# Patient Record
Sex: FEMALE | Race: WHITE | NOT HISPANIC OR LATINO | URBAN - METROPOLITAN AREA
[De-identification: names, ages, dates, MRNs, and addresses within clinical notes are randomized per-mention and may not be internally consistent; named-entity substitution may affect disease eponyms.]

---

## 2019-02-13 PROBLEM — Z00.00 ENCOUNTER FOR PREVENTIVE HEALTH EXAMINATION: Status: ACTIVE | Noted: 2019-02-13

## 2019-02-14 ENCOUNTER — EMERGENCY (EMERGENCY)
Facility: HOSPITAL | Age: 64
LOS: 0 days | Discharge: HOME | End: 2019-02-15
Attending: EMERGENCY MEDICINE | Admitting: EMERGENCY MEDICINE

## 2019-02-14 ENCOUNTER — APPOINTMENT (OUTPATIENT)
Dept: OTOLARYNGOLOGY | Facility: CLINIC | Age: 64
End: 2019-02-14
Payer: COMMERCIAL

## 2019-02-14 VITALS
HEART RATE: 62 BPM | RESPIRATION RATE: 20 BRPM | SYSTOLIC BLOOD PRESSURE: 158 MMHG | OXYGEN SATURATION: 100 % | DIASTOLIC BLOOD PRESSURE: 76 MMHG

## 2019-02-14 VITALS
SYSTOLIC BLOOD PRESSURE: 118 MMHG | WEIGHT: 152 LBS | HEIGHT: 67 IN | DIASTOLIC BLOOD PRESSURE: 78 MMHG | BODY MASS INDEX: 23.86 KG/M2

## 2019-02-14 DIAGNOSIS — Z80.1 FAMILY HISTORY OF MALIGNANT NEOPLASM OF TRACHEA, BRONCHUS AND LUNG: ICD-10-CM

## 2019-02-14 DIAGNOSIS — R42 DIZZINESS AND GIDDINESS: ICD-10-CM

## 2019-02-14 DIAGNOSIS — Z79.899 OTHER LONG TERM (CURRENT) DRUG THERAPY: ICD-10-CM

## 2019-02-14 DIAGNOSIS — Z80.8 FAMILY HISTORY OF MALIGNANT NEOPLASM OF OTHER ORGANS OR SYSTEMS: ICD-10-CM

## 2019-02-14 DIAGNOSIS — Z78.9 OTHER SPECIFIED HEALTH STATUS: ICD-10-CM

## 2019-02-14 DIAGNOSIS — Z80.3 FAMILY HISTORY OF MALIGNANT NEOPLASM OF BREAST: ICD-10-CM

## 2019-02-14 DIAGNOSIS — R09.81 NASAL CONGESTION: ICD-10-CM

## 2019-02-14 DIAGNOSIS — H81.12 BENIGN PAROXYSMAL VERTIGO, LEFT EAR: ICD-10-CM

## 2019-02-14 DIAGNOSIS — M54.2 CERVICALGIA: ICD-10-CM

## 2019-02-14 DIAGNOSIS — R11.0 NAUSEA: ICD-10-CM

## 2019-02-14 DIAGNOSIS — E03.9 HYPOTHYROIDISM, UNSPECIFIED: ICD-10-CM

## 2019-02-14 DIAGNOSIS — Z86.39 PERSONAL HISTORY OF OTHER ENDOCRINE, NUTRITIONAL AND METABOLIC DISEASE: ICD-10-CM

## 2019-02-14 DIAGNOSIS — H91.93 UNSPECIFIED HEARING LOSS, BILATERAL: ICD-10-CM

## 2019-02-14 DIAGNOSIS — T78.40XA ALLERGY, UNSPECIFIED, INITIAL ENCOUNTER: ICD-10-CM

## 2019-02-14 LAB
ALBUMIN SERPL ELPH-MCNC: 4.8 G/DL — SIGNIFICANT CHANGE UP (ref 3.5–5.2)
ALP SERPL-CCNC: 83 U/L — SIGNIFICANT CHANGE UP (ref 30–115)
ALT FLD-CCNC: 11 U/L — SIGNIFICANT CHANGE UP (ref 0–41)
ANION GAP SERPL CALC-SCNC: 19 MMOL/L — HIGH (ref 7–14)
AST SERPL-CCNC: 18 U/L — SIGNIFICANT CHANGE UP (ref 0–41)
BASOPHILS # BLD AUTO: 0.04 K/UL — SIGNIFICANT CHANGE UP (ref 0–0.2)
BASOPHILS NFR BLD AUTO: 0.5 % — SIGNIFICANT CHANGE UP (ref 0–1)
BILIRUB SERPL-MCNC: 0.4 MG/DL — SIGNIFICANT CHANGE UP (ref 0.2–1.2)
BUN SERPL-MCNC: 17 MG/DL — SIGNIFICANT CHANGE UP (ref 10–20)
CALCIUM SERPL-MCNC: 9.8 MG/DL — SIGNIFICANT CHANGE UP (ref 8.5–10.1)
CHLORIDE SERPL-SCNC: 97 MMOL/L — LOW (ref 98–110)
CO2 SERPL-SCNC: 24 MMOL/L — SIGNIFICANT CHANGE UP (ref 17–32)
CREAT SERPL-MCNC: 0.7 MG/DL — SIGNIFICANT CHANGE UP (ref 0.7–1.5)
EOSINOPHIL # BLD AUTO: 0.03 K/UL — SIGNIFICANT CHANGE UP (ref 0–0.7)
EOSINOPHIL NFR BLD AUTO: 0.4 % — SIGNIFICANT CHANGE UP (ref 0–8)
GLUCOSE SERPL-MCNC: 107 MG/DL — HIGH (ref 70–99)
HCT VFR BLD CALC: 42.1 % — SIGNIFICANT CHANGE UP (ref 37–47)
HGB BLD-MCNC: 14.1 G/DL — SIGNIFICANT CHANGE UP (ref 12–16)
IMM GRANULOCYTES NFR BLD AUTO: 0.4 % — HIGH (ref 0.1–0.3)
LYMPHOCYTES # BLD AUTO: 1.25 K/UL — SIGNIFICANT CHANGE UP (ref 1.2–3.4)
LYMPHOCYTES # BLD AUTO: 14.9 % — LOW (ref 20.5–51.1)
MAGNESIUM SERPL-MCNC: 2.2 MG/DL — SIGNIFICANT CHANGE UP (ref 1.8–2.4)
MCHC RBC-ENTMCNC: 28.5 PG — SIGNIFICANT CHANGE UP (ref 27–31)
MCHC RBC-ENTMCNC: 33.5 G/DL — SIGNIFICANT CHANGE UP (ref 32–37)
MCV RBC AUTO: 85.1 FL — SIGNIFICANT CHANGE UP (ref 81–99)
MONOCYTES # BLD AUTO: 0.31 K/UL — SIGNIFICANT CHANGE UP (ref 0.1–0.6)
MONOCYTES NFR BLD AUTO: 3.7 % — SIGNIFICANT CHANGE UP (ref 1.7–9.3)
NEUTROPHILS # BLD AUTO: 6.73 K/UL — HIGH (ref 1.4–6.5)
NEUTROPHILS NFR BLD AUTO: 80.1 % — HIGH (ref 42.2–75.2)
NRBC # BLD: 0 /100 WBCS — SIGNIFICANT CHANGE UP (ref 0–0)
PLATELET # BLD AUTO: 192 K/UL — SIGNIFICANT CHANGE UP (ref 130–400)
POTASSIUM SERPL-MCNC: 4.1 MMOL/L — SIGNIFICANT CHANGE UP (ref 3.5–5)
POTASSIUM SERPL-SCNC: 4.1 MMOL/L — SIGNIFICANT CHANGE UP (ref 3.5–5)
PROT SERPL-MCNC: 7.3 G/DL — SIGNIFICANT CHANGE UP (ref 6–8)
RBC # BLD: 4.95 M/UL — SIGNIFICANT CHANGE UP (ref 4.2–5.4)
RBC # FLD: 12.1 % — SIGNIFICANT CHANGE UP (ref 11.5–14.5)
SODIUM SERPL-SCNC: 140 MMOL/L — SIGNIFICANT CHANGE UP (ref 135–146)
TROPONIN T SERPL-MCNC: <0.01 NG/ML — SIGNIFICANT CHANGE UP
WBC # BLD: 8.39 K/UL — SIGNIFICANT CHANGE UP (ref 4.8–10.8)
WBC # FLD AUTO: 8.39 K/UL — SIGNIFICANT CHANGE UP (ref 4.8–10.8)

## 2019-02-14 PROCEDURE — 95992 CANALITH REPOSITIONING PROC: CPT

## 2019-02-14 PROCEDURE — 99204 OFFICE O/P NEW MOD 45 MIN: CPT | Mod: 25

## 2019-02-14 PROCEDURE — 92570 ACOUSTIC IMMITANCE TESTING: CPT

## 2019-02-14 PROCEDURE — 92557 COMPREHENSIVE HEARING TEST: CPT

## 2019-02-14 RX ORDER — FLUCONAZOLE 150 MG/1
150 TABLET ORAL
Qty: 3 | Refills: 0 | Status: ACTIVE | COMMUNITY
Start: 2018-08-24

## 2019-02-14 RX ORDER — CIPROFLOXACIN HYDROCHLORIDE 500 MG/1
500 TABLET, FILM COATED ORAL
Qty: 20 | Refills: 0 | Status: ACTIVE | COMMUNITY
Start: 2019-02-06

## 2019-02-14 RX ORDER — FLUTICASONE PROPIONATE 50 UG/1
50 SPRAY, METERED NASAL
Qty: 16 | Refills: 0 | Status: ACTIVE | COMMUNITY
Start: 2019-02-03

## 2019-02-14 RX ORDER — TERCONAZOLE 4 MG/G
0.4 CREAM VAGINAL
Qty: 45 | Refills: 0 | Status: COMPLETED | COMMUNITY
Start: 2018-08-24

## 2019-02-14 RX ORDER — MONTELUKAST 10 MG/1
10 TABLET, FILM COATED ORAL
Qty: 30 | Refills: 0 | Status: ACTIVE | COMMUNITY
Start: 2018-08-22

## 2019-02-14 RX ORDER — LEVOTHYROXINE SODIUM 0.12 MG/1
125 TABLET ORAL
Qty: 64 | Refills: 0 | Status: COMPLETED | COMMUNITY
Start: 2018-11-06

## 2019-02-14 RX ORDER — MECLIZINE HCL 12.5 MG
50 TABLET ORAL ONCE
Qty: 0 | Refills: 0 | Status: COMPLETED | OUTPATIENT
Start: 2019-02-14 | End: 2019-02-14

## 2019-02-14 RX ORDER — SODIUM CHLORIDE 9 MG/ML
1000 INJECTION INTRAMUSCULAR; INTRAVENOUS; SUBCUTANEOUS ONCE
Qty: 0 | Refills: 0 | Status: COMPLETED | OUTPATIENT
Start: 2019-02-14 | End: 2019-02-14

## 2019-02-14 RX ADMIN — Medication 50 MILLIGRAM(S): at 20:50

## 2019-02-14 RX ADMIN — SODIUM CHLORIDE 1000 MILLILITER(S): 9 INJECTION INTRAMUSCULAR; INTRAVENOUS; SUBCUTANEOUS at 20:50

## 2019-02-14 NOTE — ED PROVIDER NOTE - PROGRESS NOTE DETAILS
Pt s/o to Dr. Gutiérrez to follow up imaging, reassess and dispo. Pt feels better at this time. Labs and imaging discussed with pt and results printed for pt. Will give meclizine and have pt f/up with ENT.

## 2019-02-14 NOTE — ASSESSMENT
[FreeTextEntry1] : - Eply performed today, handout provided and reviewed\par - f/up in 2-3 months. Will repeat audiogram at that time

## 2019-02-14 NOTE — ED PROVIDER NOTE - CARE PROVIDER_API CALL
Vivian Pedro)  Otolaryngology  [address] [place] [address], [address] [place] [address]  Phone: [phone]  Fax: [phone]  Follow Up Time: 1-3 Days

## 2019-02-14 NOTE — DATA REVIEWED
[de-identified] : 2/14/19: right normal hearing 250-4khz, sloping to mod SNHL at 8khz. Left: normal hearing 250-3khz sloping to mod/mod severe mixed HL at 8khz. Type A tymps bilaterally.

## 2019-02-14 NOTE — ED PROVIDER NOTE - ATTENDING CONTRIBUTION TO CARE
64 yo female with PMH hypothyroidism presents c/o dizziness x 2 weeks. Pt [place] [facility] [place] Claritin as she feels it is related to her sinuses. Reports pressure in face and intermittent left sided HA. Denies any focal weakness, paresthesias, changes in speech, vision, or ataxia. Pt reports sx worse with movement. + nasal congestion and ear fullness. HAs treated with ibuprofen with some relief. Seen by ENT today who performed Epley maneuver which worsened sx significantly  per pt.     VITAL SIGNS: noted  CONSTITUTIONAL: Well-developed; well-nourished; in no acute distress  HEAD: Normocephalic; atraumatic  EYES: PERRL, EOM intact; conjunctiva and sclera clear  ENT: No nasal discharge; airway clear. MMM  NECK: Supple; non tender. No anterior cervical lymphadenopathy noted  CARD: S1, S2 normal; no murmurs, gallops, or rubs. Regular rate and rhythm  RESP: CTAB/L, no wheezes, rales or rhonchi  ABD: Normal bowel sounds; soft; non-distended; non-tender; no hepatosplenomegaly. No CVA tenderness  EXT: Normal ROM. No calf tenderness or edema. Distal pulses intact  NEURO: AAO x 3, normal speech, no facial asymmetry, negative pronator drift, no ataxia, positive Romberg, no dysdiadokinesia, no nystagmus,  sensory equal and intact.   SKIN: Skin exam is warm and dry, no acute rash  MS: No midline spinal tenderness , + left paracervical tenderness, no skin changes

## 2019-02-14 NOTE — ED PROVIDER NOTE - OBJECTIVE STATEMENT
Pt is a 64 y/o female with hx of hypothyroidism who presents to ED for dizziness for 1 week, intermittent, worse with head movement, described as room spinning. + fullness to ears that resolved, and sinus pressure, congestion. + nausea. No vomiting, headache, chest pain, SOB, palpitations, syncope. Pt was [place] [facility] [place] started on flonase and claritin with mild relief. Today pt was seen by Dr. Pedro, had Epley's maneuver performed and felt worse after so came here for eval. No hx of similar.

## 2019-02-14 NOTE — CONSULT LETTER
[Dear  ___] : Dear  [unfilled], [Consult Letter:] : I had the pleasure of evaluating your patient, [unfilled]. [Please see my note below.] : Please see my note below. [Consult Closing:] : Thank you very much for allowing me to participate in the care of this patient.  If you have any questions, please do not hesitate to contact me. [Sincerely,] : Sincerely, [FreeTextEntry2] : Grazyna Stokes MD [FreeTextEntry3] : Vivian Pedro MD\par Otolaryngology - Head & Neck Surgery\par

## 2019-02-14 NOTE — ED PROVIDER NOTE - CLINICAL SUMMARY MEDICAL DECISION MAKING FREE TEXT BOX
pt with neck pain/dizziness after epley manvr. sx improved after antivert. had neg CT/CTA.  no focal def. dc w/ outpt f/u.

## 2019-02-14 NOTE — ED PROVIDER NOTE - PHYSICAL EXAMINATION
Constitutional: Well developed, well nourished. NAD.  Head: Normocephalic, atraumatic.  Eyes: PERRL. EOMI.  ENT: No nasal discharge. Mucous membranes moist.  Neck: Supple. Painless ROM.  Cardiovascular: Normal S1, S2. Regular rate and rhythm. No murmurs, rubs, or gallops.  Pulmonary: Normal respiratory rate and effort. Lungs clear to auscultation bilaterally. No wheezing, rales, or rhonchi.  Abdominal: Soft. Nondistended. Nontender. No rebound, guarding, rigidity.  Extremities. Pelvis stable. No lower extremity edema, symmetric calves.  Skin: No rashes, cyanosis.  Neuro: CN II-XII grossly intact, no facial asymmetry, no slurred speech. Strength 5/5 in upper and lower extremities. Sensation intact in all extremities. FNF testing normal. No pronator drift. Normal HINTS exam.  Psych: Normal mood. Normal affect.

## 2019-02-14 NOTE — ED PROVIDER NOTE - NS ED ROS FT
Constitutional: No fever, chills.  Eyes: No visual changes.  ENT: No hearing changes. No sore throat.  Neck: No neck pain or stiffness.  Cardiovascular: No chest pain, palpitations, edema.  Pulmonary: No SOB, cough. No hemoptysis.  Abdominal: No abdominal pain, vomiting, diarrhea. + nausea.  : No dysuria, frequency.  Neuro: No headache, syncope. + dizziness.  MS: No back pain. No calf pain/swelling.  Psych: No suicidal ideations.

## 2019-02-14 NOTE — HISTORY OF PRESENT ILLNESS
[de-identified] : Patient here today c/o recent onset of dizziness. Patient states 2/2/19 she woke up feeling dizzy, room spinning sensation, worse with turning to left. Patient admits when she sits up fast she has room spinning. Sensation lasts a few minutes. She is unsure of hearing loss or tinnitus. Dizziness happens when laying to sitting. Dizziness is every morning. Also gets it during day if bends over. She then developed left ear pain. Patient was seen in urgent care was told to have fluid in ears. Was given Flonase and Claritin which has not helped that much. Patient feels facial and ear pressure. Worse on the left. Patient unable to sleep on left side.  Patient also c/o PND. Patient has a family h/o Menieres disease. She is also planning on flying in 2 weeks. PCP did not see fluid 2 days ago. She also reports itchiness in her ears, bilaterally. Having left ear pressure now, started day after dizziness started. She is not having nasal congestion, no rhinorrhea. Ear itching has happened before, worse recently. Denies fevers, chills.

## 2019-02-15 VITALS — WEIGHT: 145.06 LBS

## 2019-02-15 RX ORDER — LORATADINE, PSEUDOEPHEDRINE SULFATE 5; 120 MG/1; MG/1
1 TABLET, FILM COATED, EXTENDED RELEASE ORAL
Qty: 14 | Refills: 0
Start: 2019-02-15 | End: 2019-02-28

## 2019-02-15 RX ORDER — MECLIZINE HCL 12.5 MG
1 TABLET ORAL
Qty: 15 | Refills: 0
Start: 2019-02-15 | End: 2019-02-19

## 2019-02-15 RX ADMIN — SODIUM CHLORIDE 1000 MILLILITER(S): 9 INJECTION INTRAMUSCULAR; INTRAVENOUS; SUBCUTANEOUS at 02:16

## 2020-04-21 ENCOUNTER — APPOINTMENT (OUTPATIENT)
Dept: OBGYN | Facility: CLINIC | Age: 65
End: 2020-04-21

## 2022-03-03 ENCOUNTER — LABORATORY RESULT (OUTPATIENT)
Age: 67
End: 2022-03-03

## 2022-03-04 ENCOUNTER — RESULT CHARGE (OUTPATIENT)
Age: 67
End: 2022-03-04

## 2022-03-04 ENCOUNTER — APPOINTMENT (OUTPATIENT)
Dept: OBGYN | Facility: CLINIC | Age: 67
End: 2022-03-04
Payer: MEDICARE

## 2022-03-04 VITALS
BODY MASS INDEX: 25.74 KG/M2 | SYSTOLIC BLOOD PRESSURE: 134 MMHG | DIASTOLIC BLOOD PRESSURE: 79 MMHG | HEIGHT: 67 IN | HEART RATE: 71 BPM | TEMPERATURE: 97.6 F | OXYGEN SATURATION: 99 % | WEIGHT: 164 LBS

## 2022-03-04 DIAGNOSIS — N95.8 OTHER SPECIFIED MENOPAUSAL AND PERIMENOPAUSAL DISORDERS: ICD-10-CM

## 2022-03-04 LAB
BILIRUB UR QL STRIP: NORMAL
CLARITY UR: CLEAR
COLLECTION METHOD: NORMAL
GLUCOSE UR-MCNC: NORMAL
HCG UR QL: 0.2 EU/DL
HGB UR QL STRIP.AUTO: NORMAL
KETONES UR-MCNC: NORMAL
LEUKOCYTE ESTERASE UR QL STRIP: NORMAL
NITRITE UR QL STRIP: NORMAL
PH UR STRIP: 7
PROT UR STRIP-MCNC: NORMAL
SP GR UR STRIP: 1.02

## 2022-03-04 PROCEDURE — 99213 OFFICE O/P EST LOW 20 MIN: CPT

## 2022-03-04 NOTE — HISTORY OF PRESENT ILLNESS
[FreeTextEntry1] : pt in office for annual exam.  Patient Yaneth Bui presents for annual GYN visit she denies any postmenopausal bleeding she had a mammography in October which was normal and a colonoscopy in 2021.

## 2022-05-06 ENCOUNTER — NON-APPOINTMENT (OUTPATIENT)
Age: 67
End: 2022-05-06

## 2022-08-07 ENCOUNTER — NON-APPOINTMENT (OUTPATIENT)
Age: 67
End: 2022-08-07

## 2023-02-15 ENCOUNTER — EMERGENCY (EMERGENCY)
Facility: HOSPITAL | Age: 68
LOS: 0 days | Discharge: HOME | End: 2023-02-15
Attending: EMERGENCY MEDICINE
Payer: MEDICARE

## 2023-02-15 VITALS
OXYGEN SATURATION: 100 % | RESPIRATION RATE: 16 BRPM | DIASTOLIC BLOOD PRESSURE: 79 MMHG | HEART RATE: 54 BPM | TEMPERATURE: 95 F | SYSTOLIC BLOOD PRESSURE: 138 MMHG

## 2023-02-15 VITALS
DIASTOLIC BLOOD PRESSURE: 90 MMHG | HEART RATE: 70 BPM | SYSTOLIC BLOOD PRESSURE: 145 MMHG | RESPIRATION RATE: 18 BRPM | OXYGEN SATURATION: 98 % | TEMPERATURE: 98 F

## 2023-02-15 DIAGNOSIS — J18.9 PNEUMONIA, UNSPECIFIED ORGANISM: ICD-10-CM

## 2023-02-15 DIAGNOSIS — R42 DIZZINESS AND GIDDINESS: ICD-10-CM

## 2023-02-15 DIAGNOSIS — R06.02 SHORTNESS OF BREATH: ICD-10-CM

## 2023-02-15 DIAGNOSIS — R11.2 NAUSEA WITH VOMITING, UNSPECIFIED: ICD-10-CM

## 2023-02-15 DIAGNOSIS — E03.9 HYPOTHYROIDISM, UNSPECIFIED: ICD-10-CM

## 2023-02-15 DIAGNOSIS — Z20.822 CONTACT WITH AND (SUSPECTED) EXPOSURE TO COVID-19: ICD-10-CM

## 2023-02-15 LAB
ALBUMIN SERPL ELPH-MCNC: 4.5 G/DL — SIGNIFICANT CHANGE UP (ref 3.5–5.2)
ALP SERPL-CCNC: 88 U/L — SIGNIFICANT CHANGE UP (ref 30–115)
ALT FLD-CCNC: 10 U/L — SIGNIFICANT CHANGE UP (ref 0–41)
ANION GAP SERPL CALC-SCNC: 7 MMOL/L — SIGNIFICANT CHANGE UP (ref 7–14)
APPEARANCE UR: CLEAR — SIGNIFICANT CHANGE UP
AST SERPL-CCNC: 18 U/L — SIGNIFICANT CHANGE UP (ref 0–41)
BASOPHILS # BLD AUTO: 0.06 K/UL — SIGNIFICANT CHANGE UP (ref 0–0.2)
BASOPHILS NFR BLD AUTO: 0.6 % — SIGNIFICANT CHANGE UP (ref 0–1)
BILIRUB SERPL-MCNC: 0.2 MG/DL — SIGNIFICANT CHANGE UP (ref 0.2–1.2)
BILIRUB UR-MCNC: NEGATIVE — SIGNIFICANT CHANGE UP
BUN SERPL-MCNC: 16 MG/DL — SIGNIFICANT CHANGE UP (ref 10–20)
CALCIUM SERPL-MCNC: 9.3 MG/DL — SIGNIFICANT CHANGE UP (ref 8.4–10.4)
CHLORIDE SERPL-SCNC: 103 MMOL/L — SIGNIFICANT CHANGE UP (ref 98–110)
CO2 SERPL-SCNC: 28 MMOL/L — SIGNIFICANT CHANGE UP (ref 17–32)
COLOR SPEC: SIGNIFICANT CHANGE UP
CREAT SERPL-MCNC: 0.7 MG/DL — SIGNIFICANT CHANGE UP (ref 0.7–1.5)
DIFF PNL FLD: NEGATIVE — SIGNIFICANT CHANGE UP
EGFR: 95 ML/MIN/1.73M2 — SIGNIFICANT CHANGE UP
EOSINOPHIL # BLD AUTO: 0.03 K/UL — SIGNIFICANT CHANGE UP (ref 0–0.7)
EOSINOPHIL NFR BLD AUTO: 0.3 % — SIGNIFICANT CHANGE UP (ref 0–8)
FLUAV AG NPH QL: SIGNIFICANT CHANGE UP
FLUBV AG NPH QL: SIGNIFICANT CHANGE UP
GLUCOSE SERPL-MCNC: 122 MG/DL — HIGH (ref 70–99)
GLUCOSE UR QL: NEGATIVE — SIGNIFICANT CHANGE UP
HCT VFR BLD CALC: 41.4 % — SIGNIFICANT CHANGE UP (ref 37–47)
HGB BLD-MCNC: 13.6 G/DL — SIGNIFICANT CHANGE UP (ref 12–16)
IMM GRANULOCYTES NFR BLD AUTO: 0.3 % — SIGNIFICANT CHANGE UP (ref 0.1–0.3)
KETONES UR-MCNC: ABNORMAL
LACTATE SERPL-SCNC: 1.6 MMOL/L — SIGNIFICANT CHANGE UP (ref 0.7–2)
LEUKOCYTE ESTERASE UR-ACNC: NEGATIVE — SIGNIFICANT CHANGE UP
LIDOCAIN IGE QN: 31 U/L — SIGNIFICANT CHANGE UP (ref 7–60)
LYMPHOCYTES # BLD AUTO: 1.11 K/UL — LOW (ref 1.2–3.4)
LYMPHOCYTES # BLD AUTO: 11.8 % — LOW (ref 20.5–51.1)
MCHC RBC-ENTMCNC: 29.3 PG — SIGNIFICANT CHANGE UP (ref 27–31)
MCHC RBC-ENTMCNC: 32.9 G/DL — SIGNIFICANT CHANGE UP (ref 32–37)
MCV RBC AUTO: 89.2 FL — SIGNIFICANT CHANGE UP (ref 81–99)
MONOCYTES # BLD AUTO: 0.36 K/UL — SIGNIFICANT CHANGE UP (ref 0.1–0.6)
MONOCYTES NFR BLD AUTO: 3.8 % — SIGNIFICANT CHANGE UP (ref 1.7–9.3)
NEUTROPHILS # BLD AUTO: 7.78 K/UL — HIGH (ref 1.4–6.5)
NEUTROPHILS NFR BLD AUTO: 83.2 % — HIGH (ref 42.2–75.2)
NITRITE UR-MCNC: NEGATIVE — SIGNIFICANT CHANGE UP
NRBC # BLD: 0 /100 WBCS — SIGNIFICANT CHANGE UP (ref 0–0)
PH UR: 7 — SIGNIFICANT CHANGE UP (ref 5–8)
PLATELET # BLD AUTO: 187 K/UL — SIGNIFICANT CHANGE UP (ref 130–400)
POTASSIUM SERPL-MCNC: 4.3 MMOL/L — SIGNIFICANT CHANGE UP (ref 3.5–5)
POTASSIUM SERPL-SCNC: 4.3 MMOL/L — SIGNIFICANT CHANGE UP (ref 3.5–5)
PROT SERPL-MCNC: 6.5 G/DL — SIGNIFICANT CHANGE UP (ref 6–8)
PROT UR-MCNC: SIGNIFICANT CHANGE UP
RBC # BLD: 4.64 M/UL — SIGNIFICANT CHANGE UP (ref 4.2–5.4)
RBC # FLD: 12.4 % — SIGNIFICANT CHANGE UP (ref 11.5–14.5)
RSV RNA NPH QL NAA+NON-PROBE: SIGNIFICANT CHANGE UP
SARS-COV-2 RNA SPEC QL NAA+PROBE: SIGNIFICANT CHANGE UP
SODIUM SERPL-SCNC: 138 MMOL/L — SIGNIFICANT CHANGE UP (ref 135–146)
SP GR SPEC: 1.01 — SIGNIFICANT CHANGE UP (ref 1.01–1.03)
TROPONIN T SERPL-MCNC: <0.01 NG/ML — SIGNIFICANT CHANGE UP
UROBILINOGEN FLD QL: SIGNIFICANT CHANGE UP
WBC # BLD: 9.37 K/UL — SIGNIFICANT CHANGE UP (ref 4.8–10.8)
WBC # FLD AUTO: 9.37 K/UL — SIGNIFICANT CHANGE UP (ref 4.8–10.8)

## 2023-02-15 PROCEDURE — 96374 THER/PROPH/DIAG INJ IV PUSH: CPT | Mod: XU

## 2023-02-15 PROCEDURE — 99285 EMERGENCY DEPT VISIT HI MDM: CPT | Mod: FS

## 2023-02-15 PROCEDURE — 71045 X-RAY EXAM CHEST 1 VIEW: CPT

## 2023-02-15 PROCEDURE — 71045 X-RAY EXAM CHEST 1 VIEW: CPT | Mod: 26

## 2023-02-15 PROCEDURE — 71260 CT THORAX DX C+: CPT | Mod: MA

## 2023-02-15 PROCEDURE — 80053 COMPREHEN METABOLIC PANEL: CPT

## 2023-02-15 PROCEDURE — 85025 COMPLETE CBC W/AUTO DIFF WBC: CPT

## 2023-02-15 PROCEDURE — 84484 ASSAY OF TROPONIN QUANT: CPT

## 2023-02-15 PROCEDURE — 93005 ELECTROCARDIOGRAM TRACING: CPT

## 2023-02-15 PROCEDURE — 87086 URINE CULTURE/COLONY COUNT: CPT

## 2023-02-15 PROCEDURE — 81003 URINALYSIS AUTO W/O SCOPE: CPT

## 2023-02-15 PROCEDURE — 0241U: CPT

## 2023-02-15 PROCEDURE — 93010 ELECTROCARDIOGRAM REPORT: CPT

## 2023-02-15 PROCEDURE — 71260 CT THORAX DX C+: CPT | Mod: 26,MA

## 2023-02-15 PROCEDURE — 83605 ASSAY OF LACTIC ACID: CPT

## 2023-02-15 PROCEDURE — 99285 EMERGENCY DEPT VISIT HI MDM: CPT | Mod: 25

## 2023-02-15 PROCEDURE — 83690 ASSAY OF LIPASE: CPT

## 2023-02-15 PROCEDURE — 36415 COLL VENOUS BLD VENIPUNCTURE: CPT

## 2023-02-15 RX ORDER — MECLIZINE HCL 12.5 MG
50 TABLET ORAL ONCE
Refills: 0 | Status: COMPLETED | OUTPATIENT
Start: 2023-02-15 | End: 2023-02-15

## 2023-02-15 RX ORDER — SODIUM CHLORIDE 9 MG/ML
1000 INJECTION INTRAMUSCULAR; INTRAVENOUS; SUBCUTANEOUS ONCE
Refills: 0 | Status: COMPLETED | OUTPATIENT
Start: 2023-02-15 | End: 2023-02-15

## 2023-02-15 RX ORDER — METOCLOPRAMIDE HCL 10 MG
10 TABLET ORAL ONCE
Refills: 0 | Status: COMPLETED | OUTPATIENT
Start: 2023-02-15 | End: 2023-02-15

## 2023-02-15 RX ADMIN — SODIUM CHLORIDE 1000 MILLILITER(S): 9 INJECTION INTRAMUSCULAR; INTRAVENOUS; SUBCUTANEOUS at 18:03

## 2023-02-15 RX ADMIN — Medication 50 MILLIGRAM(S): at 18:03

## 2023-02-15 RX ADMIN — Medication 104 MILLIGRAM(S): at 18:03

## 2023-02-15 NOTE — ED PROVIDER NOTE - NS ED ATTENDING STATEMENT MOD
This was a shared visit with the HERIBERTO. I reviewed and verified the documentation and independently performed the documented:

## 2023-02-15 NOTE — ED PROVIDER NOTE - PATIENT PORTAL LINK FT
You can access the FollowMyHealth Patient Portal offered by [facility] by registering at the following website: [email or web address]. By joining [employer]’s FollowMyHealth portal, you will also be able to view your health information using other applications (apps) compatible with our system.

## 2023-02-15 NOTE — ED PROVIDER NOTE - DIFFERENTIAL DIAGNOSIS
Differential Diagnosis Dizziness, N/V, weakness, chills, mild dyspnea. R/o intracranial pathology vs sepsis vs ACS vs PE vs fluid overload vs pneumonia

## 2023-02-15 NOTE — ED PROVIDER NOTE - CONSIDERATION OF ADMISSION OBSERVATION
Consideration of Admission/Observation Patient with work up in ED showing (+) PNA but well appearing, ambulatory without desaturation, tolerating PO, no other abnormalities in labs. No indication for admission at this time.

## 2023-02-15 NOTE — ED ADULT NURSE NOTE - CAS TRG GEN SKIN CONDITION
Was the patient seen in the last year in this department? Yes    Does patient have an active prescription for medications requested? No     Received Request Via: Pharmacy  
Warm

## 2023-02-15 NOTE — ED ADULT TRIAGE NOTE - CHIEF COMPLAINT QUOTE
BIBA from the dermatology office for an episode of vertigo and weakness BIBA from the dermatology office for an episode of vertigo and weakness pt also reports nausea, states she vomited en route to ED

## 2023-02-15 NOTE — ED PROVIDER NOTE - OBJECTIVE STATEMENT
67 year old female with a history of Vertigo and Hypothyroidism presents to the ED with dizziness. Patient states that she was at her Dermatology Office today for an exam and when she laid down on the exam table she developed sudden onset of dizziness which felt like her normal vertigo. She describes room spinning dizziness worse with change in position associated with nausea, multiple episodes of vomiting, shaking chills, and generalized weakness. Patient denies fevers however she admits to mild shortness of breath over the past week. denies chest pain, cough, sore throat, dysuria, diarrhea, leg swelling, paresthesias, unilateral weakness, headache.

## 2023-02-15 NOTE — ED PROVIDER NOTE - CLINICAL SUMMARY MEDICAL DECISION MAKING FREE TEXT BOX
Patient presented with dizziness described as vertigo (chronic for patient), nausea, vomiting and chills. Otherwise afebrile, HD stable, neurovascularly intact, abd non-tender, lungs clear, normal O2 saturation on RA, NAD. EKG obtained and non-ischemic without evidence of STEMI. Obtained labs which were grossly unremarkable including no significant leukocytosis, anemia, signs of dehydration/SIN, transaminitis or significant electrolyte abnormalities. Trop negative. Chest xray showed (+) ?questional opacity in RLL but unclear, otherwise negative for pneumothorax, widened mediastinum, evidence of rib fractures, enlarged cardiac silhouette or any other emergent pathologies. CT chest obtained which was subsequently negative. Patient felt better after tx in ED. Ambulatory without desaturation, tolerating PO. Given the above, will discharge home with outpatient follow up. Patient agreeable with plan. Agrees to return to ED for any new or worsening symptoms.

## 2023-02-15 NOTE — ED PROVIDER NOTE - PROGRESS NOTE DETAILS
Patient states that her dizziness and nausea are dramatically improved now walking around without difficulty. Ordered CT chest due to concern for right lower lobe opacity on CXR.

## 2023-02-15 NOTE — ED PROVIDER NOTE - TEST CONSIDERED BUT NOT PERFORMED
Dimer considered but not performed - patient without PE risk factors, not tachycardic or hypoxic. Symptoms resolved after work up in ED, no concern for PE at this time. Tests Considered But Not Performed

## 2023-02-15 NOTE — ED PROVIDER NOTE - PHYSICAL EXAMINATION
Physical Exam    Constitutional: noted with shaking chills s/p episode of vomiting.   Eyes: Conjunctiva pink, Sclera clear, PERRLA, EOMI.  ENT: No sinus tenderness. No nasal discharge. No oropharyngeal erythema, edema, or exudates. Uvula midline.   Cardiovascular: Regular rate, regular rhythm. No noted murmurs rubs or gallops.  Respiratory: unlabored respiratory effort, clear to auscultation bilaterally no wheezing, rales or rhonchi  Gastrointestinal: Normal bowel sounds. soft, non distended, non-tender abdomen.   Musculoskeletal: supple neck, no midline tenderness. No joint or bony deformity.   Integumentary: warm, dry, no rash  Neurologic: awake, alert, oriented x 3. CN II-XII intact moving all extremities. No nystagmus.   Psychiatric: appropriate mood, appropriate affect Physical Exam    Constitutional: noted with shaking chills s/p episode of vomiting.   Eyes: Conjunctiva pink, Sclera clear, PERRLA, EOMI.  ENT: No sinus tenderness. No nasal discharge. No oropharyngeal erythema, edema, or exudates. Uvula midline.   Cardiovascular: Regular rate, regular rhythm. No noted murmurs rubs or gallops.  Respiratory: unlabored respiratory effort, clear to auscultation bilaterally no wheezing, rales or rhonchi  Gastrointestinal: Normal bowel sounds. soft, non distended, non-tender abdomen.   Musculoskeletal: supple neck, no midline tenderness. No joint or bony deformity.   Integumentary: warm, dry, no rash  Neurologic: awake, alert, oriented x 3. CN II-XII intact moving all extremities. No nystagmus.

## 2023-02-15 NOTE — ED PROVIDER NOTE - NSFOLLOWUPINSTRUCTIONS_ED_ALL_ED_FT
Vertigo    Vertigo is the feeling that you or your surroundings are moving when they are not. Vertigo can be dangerous if it occurs while you are doing something that could endanger you or others, such as driving.    CAUSES  This condition is caused by a disturbance in the signals that are sent by your body's sensory systems to your brain. Different causes of a disturbance can lead to vertigo, including:    Infections, especially in the inner ear.  A bad reaction to a drug, or misuse of alcohol and medicines.  Withdrawal from drugs or alcohol.  Quickly changing positions, as when lying down or rolling over in bed.  Migraine headaches.  Decreased blood flow to the brain.  Decreased blood pressure.  Increased pressure in the brain from a head or neck injury, stroke, infection, tumor, or bleeding.  Central nervous system disorders.    SYMPTOMS  Symptoms of this condition usually occur when you move your head or your eyes in different directions. Symptoms may start suddenly, and they usually last for less than a minute. Symptoms may include:    Loss of balance and falling.  Feeling like you are spinning or moving.  Feeling like your surroundings are spinning or moving.  Nausea and vomiting.  Blurred vision or double vision.  Difficulty hearing.  Slurred speech.  Dizziness.  Involuntary eye movement (nystagmus).    Symptoms can be mild and cause only slight annoyance, or they can be severe and interfere with daily life. Episodes of vertigo may return (recur) over time, and they are often triggered by certain movements. Symptoms may improve over time.    DIAGNOSIS  This condition may be diagnosed based on medical history and the quality of your nystagmus. Your health care provider may test your eye movements by asking you to quickly change positions to trigger the nystagmus. This may be called the [place]-Hallpike test, head thrust test, or roll test. You may be referred to a health care provider who specializes in ear, nose, and throat (ENT) problems (otolaryngologist) or a provider who specializes in disorders of the central nervous system (neurologist).    You may have additional testing, including:    A physical exam.  Blood tests.  MRI.  A CT scan.  An electrocardiogram (ECG). This records electrical activity in your heart.  An electroencephalogram (EEG). This records electrical activity in your brain.  Hearing tests.    TREATMENT  Treatment for this condition depends on the cause and the severity of the symptoms. Treatment options include:    Medicines to treat nausea or vertigo. These are usually used for severe cases. Some medicines that are used to treat other conditions may also reduce or eliminate vertigo symptoms. These include:  Medicines that control allergies (antihistamines).  Medicines that control seizures (anticonvulsants).  Medicines that relieve depression (antidepressants).  Medicines that relieve anxiety (sedatives).  Head movements to adjust your inner ear back to normal. If your vertigo is caused by an ear problem, your health care provider may recommend certain movements to correct the problem.  Surgery. This is rare.    HOME CARE INSTRUCTIONS  Safety     Move slowly. Avoid sudden body or head movements.  Avoid driving.  Avoid operating heavy machinery.  Avoid doing any tasks that would cause danger to you or others if you would have a vertigo episode during the task.  If you have trouble walking or keeping your balance, try using a cane for stability. If you feel dizzy or unstable, sit down right away.  Return to your normal activities as told by your health care provider. Ask your health care provider what activities are safe for you.    General Instructions     Take over-the-counter and prescription medicines only as told by your health care provider.  Avoid certain positions or movements as told by your health care provider.  Drink enough fluid to keep your urine clear or pale yellow.  Keep all follow-up visits as told by your health care provider. This is important.    SEEK MEDICAL CARE IF:  Your medicines do not relieve your vertigo or they make it worse.  You have a fever.  Your condition gets worse or you develop new symptoms.  Your family or friends notice any behavioral changes.  Your nausea or vomiting gets worse.  You have numbness or a "pins and needles" sensation in part of your body.    SEEK IMMEDIATE MEDICAL CARE IF:  You have difficulty moving or speaking.  You are always dizzy.  You faint.  You develop severe headaches.  You have weakness in your hands, arms, or legs.  You have changes in your hearing or vision.  You develop a stiff neck.  You develop sensitivity to light.    ADDITIONAL NOTES AND INSTRUCTIONS    Please follow up with your Primary MD in 24-48 hr.  Seek immediate medical care for any new/worsening signs or symptoms.

## 2023-02-15 NOTE — ED ADULT NURSE NOTE - OBJECTIVE STATEMENT
BIBA from the dermatology office for an episode of vertigo and weakness pt also reports nausea, states she vomited en route to ED

## 2023-02-16 LAB
CULTURE RESULTS: SIGNIFICANT CHANGE UP
SPECIMEN SOURCE: SIGNIFICANT CHANGE UP

## 2023-06-23 ENCOUNTER — EMERGENCY (EMERGENCY)
Facility: HOSPITAL | Age: 68
LOS: 0 days | Discharge: HOME | End: 2023-06-23
Attending: EMERGENCY MEDICINE
Payer: MEDICARE

## 2023-06-23 VITALS
RESPIRATION RATE: 18 BRPM | OXYGEN SATURATION: 99 % | DIASTOLIC BLOOD PRESSURE: 75 MMHG | WEIGHT: 149.91 LBS | TEMPERATURE: 98 F | HEART RATE: 81 BPM | SYSTOLIC BLOOD PRESSURE: 116 MMHG | HEIGHT: 67 IN

## 2023-06-23 DIAGNOSIS — R42 DIZZINESS AND GIDDINESS: ICD-10-CM

## 2023-06-23 DIAGNOSIS — R10.9 UNSPECIFIED ABDOMINAL PAIN: ICD-10-CM

## 2023-06-23 DIAGNOSIS — R11.2 NAUSEA WITH VOMITING, UNSPECIFIED: ICD-10-CM

## 2023-06-23 DIAGNOSIS — E03.9 HYPOTHYROIDISM, UNSPECIFIED: ICD-10-CM

## 2023-06-23 DIAGNOSIS — R10.13 EPIGASTRIC PAIN: ICD-10-CM

## 2023-06-23 LAB
ALBUMIN SERPL ELPH-MCNC: 4.4 G/DL — SIGNIFICANT CHANGE UP (ref 3.5–5.2)
ALP SERPL-CCNC: 88 U/L — SIGNIFICANT CHANGE UP (ref 30–115)
ALT FLD-CCNC: 10 U/L — SIGNIFICANT CHANGE UP (ref 0–41)
ANION GAP SERPL CALC-SCNC: 14 MMOL/L — SIGNIFICANT CHANGE UP (ref 7–14)
AST SERPL-CCNC: 22 U/L — SIGNIFICANT CHANGE UP (ref 0–41)
BASOPHILS # BLD AUTO: 0.04 K/UL — SIGNIFICANT CHANGE UP (ref 0–0.2)
BASOPHILS NFR BLD AUTO: 0.3 % — SIGNIFICANT CHANGE UP (ref 0–1)
BILIRUB SERPL-MCNC: 0.5 MG/DL — SIGNIFICANT CHANGE UP (ref 0.2–1.2)
BUN SERPL-MCNC: 15 MG/DL — SIGNIFICANT CHANGE UP (ref 10–20)
CALCIUM SERPL-MCNC: 9.3 MG/DL — SIGNIFICANT CHANGE UP (ref 8.4–10.5)
CHLORIDE SERPL-SCNC: 103 MMOL/L — SIGNIFICANT CHANGE UP (ref 98–110)
CO2 SERPL-SCNC: 24 MMOL/L — SIGNIFICANT CHANGE UP (ref 17–32)
CREAT SERPL-MCNC: 0.8 MG/DL — SIGNIFICANT CHANGE UP (ref 0.7–1.5)
EGFR: 81 ML/MIN/1.73M2 — SIGNIFICANT CHANGE UP
EOSINOPHIL # BLD AUTO: 0 K/UL — SIGNIFICANT CHANGE UP (ref 0–0.7)
EOSINOPHIL NFR BLD AUTO: 0 % — SIGNIFICANT CHANGE UP (ref 0–8)
GLUCOSE SERPL-MCNC: 124 MG/DL — HIGH (ref 70–99)
HCT VFR BLD CALC: 44.5 % — SIGNIFICANT CHANGE UP (ref 37–47)
HGB BLD-MCNC: 14.9 G/DL — SIGNIFICANT CHANGE UP (ref 12–16)
IMM GRANULOCYTES NFR BLD AUTO: 0.2 % — SIGNIFICANT CHANGE UP (ref 0.1–0.3)
LIDOCAIN IGE QN: 19 U/L — SIGNIFICANT CHANGE UP (ref 7–60)
LYMPHOCYTES # BLD AUTO: 0.72 K/UL — LOW (ref 1.2–3.4)
LYMPHOCYTES # BLD AUTO: 5.3 % — LOW (ref 20.5–51.1)
MCHC RBC-ENTMCNC: 28.6 PG — SIGNIFICANT CHANGE UP (ref 27–31)
MCHC RBC-ENTMCNC: 33.5 G/DL — SIGNIFICANT CHANGE UP (ref 32–37)
MCV RBC AUTO: 85.4 FL — SIGNIFICANT CHANGE UP (ref 81–99)
MONOCYTES # BLD AUTO: 0.29 K/UL — SIGNIFICANT CHANGE UP (ref 0.1–0.6)
MONOCYTES NFR BLD AUTO: 2.1 % — SIGNIFICANT CHANGE UP (ref 1.7–9.3)
NEUTROPHILS # BLD AUTO: 12.42 K/UL — HIGH (ref 1.4–6.5)
NEUTROPHILS NFR BLD AUTO: 92.1 % — HIGH (ref 42.2–75.2)
NRBC # BLD: 0 /100 WBCS — SIGNIFICANT CHANGE UP (ref 0–0)
PLATELET # BLD AUTO: 212 K/UL — SIGNIFICANT CHANGE UP (ref 130–400)
PMV BLD: 11.3 FL — HIGH (ref 7.4–10.4)
POTASSIUM SERPL-MCNC: 4.3 MMOL/L — SIGNIFICANT CHANGE UP (ref 3.5–5)
POTASSIUM SERPL-SCNC: 4.3 MMOL/L — SIGNIFICANT CHANGE UP (ref 3.5–5)
PROT SERPL-MCNC: 6.6 G/DL — SIGNIFICANT CHANGE UP (ref 6–8)
RBC # BLD: 5.21 M/UL — SIGNIFICANT CHANGE UP (ref 4.2–5.4)
RBC # FLD: 12.3 % — SIGNIFICANT CHANGE UP (ref 11.5–14.5)
SODIUM SERPL-SCNC: 141 MMOL/L — SIGNIFICANT CHANGE UP (ref 135–146)
TROPONIN T SERPL-MCNC: <0.01 NG/ML — SIGNIFICANT CHANGE UP
WBC # BLD: 13.5 K/UL — HIGH (ref 4.8–10.8)
WBC # FLD AUTO: 13.5 K/UL — HIGH (ref 4.8–10.8)

## 2023-06-23 PROCEDURE — 80053 COMPREHEN METABOLIC PANEL: CPT

## 2023-06-23 PROCEDURE — 76705 ECHO EXAM OF ABDOMEN: CPT

## 2023-06-23 PROCEDURE — 93010 ELECTROCARDIOGRAM REPORT: CPT

## 2023-06-23 PROCEDURE — 93005 ELECTROCARDIOGRAM TRACING: CPT

## 2023-06-23 PROCEDURE — 85025 COMPLETE CBC W/AUTO DIFF WBC: CPT

## 2023-06-23 PROCEDURE — 76705 ECHO EXAM OF ABDOMEN: CPT | Mod: 26

## 2023-06-23 PROCEDURE — 99285 EMERGENCY DEPT VISIT HI MDM: CPT | Mod: 25

## 2023-06-23 PROCEDURE — 36415 COLL VENOUS BLD VENIPUNCTURE: CPT

## 2023-06-23 PROCEDURE — 84484 ASSAY OF TROPONIN QUANT: CPT

## 2023-06-23 PROCEDURE — 96374 THER/PROPH/DIAG INJ IV PUSH: CPT

## 2023-06-23 PROCEDURE — 99284 EMERGENCY DEPT VISIT MOD MDM: CPT

## 2023-06-23 PROCEDURE — 83690 ASSAY OF LIPASE: CPT

## 2023-06-23 RX ORDER — ONDANSETRON 8 MG/1
4 TABLET, FILM COATED ORAL ONCE
Refills: 0 | Status: COMPLETED | OUTPATIENT
Start: 2023-06-23 | End: 2023-06-23

## 2023-06-23 RX ORDER — SODIUM CHLORIDE 9 MG/ML
1000 INJECTION INTRAMUSCULAR; INTRAVENOUS; SUBCUTANEOUS ONCE
Refills: 0 | Status: COMPLETED | OUTPATIENT
Start: 2023-06-23 | End: 2023-06-23

## 2023-06-23 RX ADMIN — SODIUM CHLORIDE 1000 MILLILITER(S): 9 INJECTION INTRAMUSCULAR; INTRAVENOUS; SUBCUTANEOUS at 17:20

## 2023-06-23 RX ADMIN — ONDANSETRON 4 MILLIGRAM(S): 8 TABLET, FILM COATED ORAL at 17:20

## 2023-06-23 NOTE — ED PROVIDER NOTE - NSFOLLOWUPINSTRUCTIONS_ED_ALL_ED_FT
Our Emergency Department Referral Coordinators will be reaching out to you in the next 24-48 hours from 9:00am to 5:00pm with a follow up appointment. Please expect a phone call from the hospital in that time frame. If you do not receive a call or if you have any questions or concerns, you can reach them at   [phone]      Acute Nausea and Vomiting    WHAT YOU NEED TO KNOW:  Acute nausea and vomiting start suddenly, worsen quickly, and last a short time.    DISCHARGE INSTRUCTIONS:    Return to the emergency department if:   You see blood in your vomit or your bowel movements.  You have sudden, severe pain in your chest and upper abdomen after hard vomiting or retching.  You have swelling in your neck and chest.   You are dizzy, cold, and thirsty and your eyes and mouth are dry.  You are urinating very little or not at all.  You have muscle weakness, leg cramps, and trouble breathing.   Your heart is beating much faster than normal.       You continue to vomit for more than 48 hours.     Contact your healthcare provider if:   You have frequent dry heaves (vomiting but nothing comes out).  Your nausea and vomiting does not get better or go away after you use medicine.  You have questions or concerns about your condition or treatment.    Medicines: You may need any of the following:   Medicines may be given to calm your stomach and stop your vomiting. You may also need medicines to help you feel more relaxed or to stop nausea and vomiting caused by motion sickness.  Gastrointestinal stimulants are used to help empty your stomach and bowels. This may help decrease nausea and vomiting.  Take your medicine as directed. Contact your healthcare provider if you think your medicine is not helping or if you have side effects. Tell him or her if you are allergic to any medicine. Keep a list of the medicines, vitamins, and herbs you take. Include the amounts, and when and why you take them. Bring the list or the pill bottles to follow-up visits. Carry your medicine list with you in case of an emergency.    Prevent or manage acute nausea and vomiting:   Do not drink alcohol. Alcohol may upset or irritate your stomach. Too much alcohol can also cause acute nausea and vomiting.  Control stress. Headaches due to stress may cause nausea and vomiting. Find ways to relax and manage your stress. Get more rest and sleep  Drink more liquids as directed. Vomiting can lead to dehydration. It is important to drink more liquids to help replace lost body fluids. Ask your healthcare provider how much liquid to drink each day and which liquids are best for you. Your provider may recommend that you drink an oral rehydration solution (ORS). ORS contains water, salts, and sugar that are needed to replace the lost body fluids. Ask what kind of ORS to use, how much to drink, and where to get it.  Eat smaller meals, more often. Eat small amounts of food every 2 to 3 hours, even if you are not hungry. Food in your stomach may decrease your nausea.  Talk to your healthcare provider before you take over-the-counter (OTC) medicines. These medicines can cause serious problems if you use certain other medicines, or you have a medical condition. You may have problems if you use too much or use them for longer than the label says. Follow directions on the label carefully.     Follow up with your healthcare provider as directed: Write down your questions so you remember to ask them during your follow-up visits.

## 2023-06-23 NOTE — ED PROVIDER NOTE - OBJECTIVE STATEMENT
Patient is a 67-year-old female history of hypothyroidism and vertigo presenting for evaluation of 4 episodes of nonbloody nonbilious vomiting since 10:30 AM this morning.  She states that she felt very weak afterwards and sounded different from vomiting.  Patient's friend picked her up and brought her to the emergency department for further evaluation.  Patient denies any new food, fevers, chills, sick contacts, diarrhea, constipation, diarrhea, dysuria, hematochezia.  Patient is on Lamisil for fungal toenails, this is day 3 of using the medication.

## 2023-06-23 NOTE — ED PROVIDER NOTE - CARE PROVIDER_API CALL
Leighton Larose  Gastroenterology  [address] [place] [id number]  Phone: [phone]  Fax: [phone]  Follow Up Time:

## 2023-06-23 NOTE — ED ADULT NURSE NOTE - TEMPLATE
Hospitalist - History & Physical      Patient: Benita Topete    Unit/Bed:05/005A  YOB: 1983  MRN: [id number]   Acct: [de-identified]   PCP: BARB Reid - CNP    Date of Service: Pt seen/examined on 10/16/20  and Admitted to Inpatient with expected LOS greater than two midnights due to medical therapy. Chief Complaint:  SI, Alcohol intoxication    Assessment and Plan:-    1. Acute alcohol intoxication/withdrawal:   - CIWA protocol. Apparently patient developed rash on last admission and it was uncertain if phenobarb caused it. Will avoid phenobarb this time  - Continue MTV, folic acid, thiamine at discharge. Follow up with [facility].      2. Suicidal ideations:   - He does have  suicidal thoughts at this time. He has no plan. Suicide precautions. Psych consulted     3. COPD: without acute exacerbation. Continue home inhalers.     4. IDDMII: HgbA1c 6.9. Continue home insulin. Add ISS. Monitor BG TID and adjust accordingly     5. MDD, recurrent: resume home escitalopram, seroquel. Psych following     History Of Present Illness:      40-year-old gentleman with past medical history of COPD, alcohol abuse, seizures in the past, suicidal ideation, came to ER due to suicidal ideation again. Patient was just discharged home and states that he was feeling well but started to feel depressed again. He has been taking his antidepressants without any help. Patient states that he started drinking alcohol again. Patient did have suicidal ideation but no active plan. Patient states that he has just been feeling down. He is concerned about his social issues including his children. He states that he is depressed because his father passed away long time ago around his age. He has been actively drinking with his last drink this morning. Patient has had seizures in the past due to alcohol withdrawal.  Denies any nausea, vomiting, chest pain, shortness of breath, headaches. No other complaints.   In ER, patient was noted to be tachycardic. Labs otherwise were unremarkable. Patient alcohol abuse of 0.32. Due to this he was admitted for further care. Past Medical History:        Diagnosis Date    Asthma     COPD (chronic obstructive pulmonary disease) (HCC)     Eczema     GERD (gastroesophageal reflux disease)     History of alcohol abuse     History of marijuana use     History of tobacco abuse     quit 11/21/2017    Hx of seasonal allergies     Pancreatitis     Seizures (HCC)     etoh wdl    Type 2 diabetes mellitus without complication ([facility] [address] 05/49/8353       Past Surgical History:        Procedure Laterality Date    ANKLE ARTHROSCOPY Right 8/5/2020    ANKLE ARTHROSCOPY WITH  MEDIAL DEBRIDEMENT RIGHT ANKLE, ANKLE ARTHROTOMY WITH DEBRIDEMENT performed by Arina Cuellar DPM at [facility] Right 09/2019    DR [facility]    FRACTURE SURGERY Right 2019    orbital fracture surgery    UPPER GASTROINTESTINAL ENDOSCOPY Left 5/6/2019    EGD BIOPSY performed by Frances Hart MD at [facility]       Home Medications:   No current facility-administered medications on file prior to encounter. Current Outpatient Medications on File Prior to Encounter   Medication Sig Dispense Refill    escitalopram (LEXAPRO) 20 MG tablet Take 1 tablet by mouth daily 90 tablet 0    folic acid (FOLVITE) 1 MG tablet Take 1 tablet by mouth daily 30 tablet 3    vitamin B-1 100 MG tablet Take 1 tablet by mouth daily 30 tablet 3    QUEtiapine (SEROQUEL) 25 MG tablet Take 1 tablet by mouth nightly for 1 week, then take 1 tablet by mouth twice a day. 60 tablet 3    hydrOXYzine (VISTARIL) 100 MG capsule Take 1 capsule by mouth 4 times daily as needed for Anxiety 60 capsule 3    insulin glargine (LANTUS SOLOSTAR) 100 UNIT/ML injection pen Inject 25 Units into the skin daily 5 pen 3    NARCAN 4 MG/0.1ML LIQD nasal spray ADMINISTER A SINGLE SPRAY INTRANASALLY INTO ONE NOSTRIL. CALL 911. MAY REPEAT X 1.      nicotine (NICOTROL) 10 MG inhaler Inhale 1 puff into the lungs as needed for Smoking cessation 1 Inhaler 3    ondansetron (ZOFRAN) 4 MG tablet Take 1 tablet by mouth 3 times daily as needed for Nausea or Vomiting 15 tablet 0    insulin aspart (NOVOLOG FLEXPEN) 100 UNIT/ML injection pen Inject 2-12 units SQ TID with meals as directed per sliding scale, max dose 36 units/day 5 pen 3    albuterol sulfate HFA (PROVENTIL HFA) 108 (90 Base) MCG/ACT inhaler Inhale 2 puffs into the lungs every 6 hours as needed for Wheezing 1 Inhaler 0    Insulin Pen Needle 30G X 8 MM MISC 1 each by Does not apply route 3 times daily 500 each 3    fluticasone-salmeterol (ADVAIR) 250-50 MCG/DOSE AEPB Inhale 1 puff into the lungs every 12 hours 60 each 5    aspirin 81 MG EC tablet Take 1 tablet by mouth daily 30 tablet 3    acetaminophen (TYLENOL) 500 MG tablet Take 1,000 mg by mouth every 6 hours as needed for Pain      Multiple Vitamins-Minerals (MENS MULTIVITAMIN PLUS) TABS Take 1 tablet by mouth daily         Allergies:    Paxil [paroxetine]    Social History:    reports that he has been smoking cigarettes. He has a 11.00 pack-year smoking history. He has never used smokeless tobacco. He reports current alcohol use. He reports previous drug use. Drug: Marijuana. Family History:       Problem Relation Age of Onset    Other Mother         gestational diabetes    Other Father 39        suicide    Other Sister         hypoglycemia       Diet:  DIET CLEAR LIQUID;    Review of systems:   Pertinent positives as noted in the HPI. All other systems reviewed and negative. PHYSICAL EXAM:  /89   Pulse 105   Temp 98.5 °F (36.9 °C) (Oral)   Resp 18   Wt 168 lb (76.2 kg)   SpO2 100%   BMI 30.73 kg/m²   General appearance: No apparent distress, appears stated age and cooperative. HEENT: Normal cephalic, atraumatic without obvious deformity. Pupils equal, round, and reactive to light.   Extra ocular muscles intact. Conjunctivae/corneas clear. Neck: Supple, with full range of motion. No jugular venous distention. Trachea midline. Respiratory:  Normal respiratory effort. Clear to auscultation, bilaterally without Rales/Wheezes/Rhonchi. Cardiovascular: Regular rate and rhythm with normal S1/S2 without murmurs, rubs or gallops. Tachycardia  Abdomen: Soft, non-tender, non-distended with normal bowel sounds. Musculoskeletal:  No clubbing, cyanosis or edema bilaterally. Skin: Skin color, texture, turgor normal.  No rashes or lesions. Neurologic:  Neurovascularly intact without any focal sensory/motor deficits. Cranial nerves: II-XII intact, grossly non-focal.  Psychiatric: Alert and oriented, thought content appropriate, normal insight  Capillary Refill: Brisk,< 3 seconds   Peripheral Pulses: +2 palpable, equal bilaterally     Labs:   Recent Labs     10/16/20  1043   WBC 7.0   HGB 15.3   HCT 44.9        Recent Labs     10/16/20  1043   *   K 3.6      CO2 23   BUN 5*   CREATININE 0.5   CALCIUM 9.1     Recent Labs     10/16/20  1043   AST 29   ALT 57   BILITOT 0.2*   ALKPHOS 107     No results for input(s): INR in the last 72 hours. No results for input(s): Cindy Lowers in the last 72 hours. Urinalysis:    Lab Results   Component Value Date    NITRU NEGATIVE 10/16/2020    WBCUA 0-2 03/02/2020    BACTERIA NONE SEEN 03/02/2020    RBCUA 0-2 03/02/2020    BLOODU NEGATIVE 10/16/2020    SPECGRAV 1.011 10/12/2013    GLUCOSEU 100 10/16/2020       Radiology:   No orders to display     No results found.     Electronically signed by Mora Laboy MD on 10/16/2020 at 5:24 PM Abdominal Pain, N/V/D

## 2023-06-23 NOTE — ED PROVIDER NOTE - ATTENDING CONTRIBUTION TO CARE
67-year-old female with stabbing epigastric pain followed by several episodes of nonbloody nonbilious vomiting, no diarrhea, passing flatus and stool, thinks it might be related to the Lamisil she started 3 days ago however has been having gassy abdominal pain for which she had a pelvic ultrasound and was told she had gas and to take Gas-X, no chest pain or trouble breathing, on exam vitals appreciated, well-appearing, cor regular, lungs CTA, abdomen soft nontender nondistended, pulses equal, will check labs

## 2023-06-23 NOTE — ED PROVIDER NOTE - PROGRESS NOTE DETAILS
MS–patient states she still has epigastric pain.  Will p.o. challenge.  Right upper quadrant sono ordered. endorsed to Pepito MS–patient feeling improved.  Was able to tolerate p.o.  Ultrasound within normal limits.  Plan for discharge with GI follow-up.

## 2023-06-23 NOTE — ED PROVIDER NOTE - PATIENT PORTAL LINK FT
You can access the FollowMyHealth Patient Portal offered by [facility] by registering at the following website: [email or web address]. By joining [employer]’s FollowMyHealth portal, you will also be able to view your health information using other applications (apps) compatible with our system.

## 2023-06-23 NOTE — ED ADULT NURSE NOTE - NSICDXPASTMEDICALHX_GEN_ALL_CORE_FT
[address]  Dept: [address] (:  2014) is a 6 y.o. male,Established patient, here for evaluation of the following chief complaint(s):  ADHD      ASSESSMENT/PLAN:  I have reviewed the patient's medical history in detail and updated the computerized patient record. HPI/ROS per the patient and caregiver. Overall non toxic in appearance. Answers questions appropriately. Conditions discussed and addressed this visit include:   Here ith mom to discuss Liams behaviors  Overall very bright child  Grades are good  Definitely having issue with interaction with others, impulse control, inattention, and hyperfocus. Rohan scale sent home with mom  Mom looking to establish an IAP  Does not want meds at this time  Discussed natural and herbal alternatives and CBT for Ruddy.   She is going to look at [facility] for CBT  Follow up in office once forms are completed  Consider referral to [facility] clinic in [place]    1. Inattention  2. Attention deficit hyperactivity disorder (ADHD), combined type    Return in about 4 weeks (around 3/1/2023) for Medication evaluation, Results review, Routine follow up. SUBJECTIVE/OBJECTIVE:  ADHD  Pertinent negatives include no abdominal pain, arthralgias, chest pain, congestion, coughing, fatigue, fever, headaches, myalgias, nausea, numbness, rash, sore throat or weakness. Here for adhd testing and screening  Squirrel like behavior  Has not been tested before  In wrestling  Grades are ok  Hit and miss behaviors with other kids  Does interrupt conversations    Review of Systems   Constitutional:  Positive for activity change. Negative for appetite change, fatigue and fever. HENT:  Negative for congestion, ear pain, sore throat and trouble swallowing. Eyes:  Negative for photophobia, pain, discharge and itching.    Respiratory:  Negative for apnea, cough, chest tightness and shortness of breath. Cardiovascular:  Negative for chest pain. Gastrointestinal:  Negative for abdominal distention, abdominal pain, constipation, diarrhea and nausea. Endocrine: Negative for polydipsia, polyphagia and polyuria. Genitourinary:  Negative for difficulty urinating and urgency. Musculoskeletal:  Negative for arthralgias, back pain and myalgias. Skin:  Negative for color change and rash. Allergic/Immunologic: Negative for environmental allergies and food allergies. Neurological:  Negative for dizziness, weakness, numbness and headaches. Psychiatric/Behavioral:  Positive for agitation, behavioral problems and decreased concentration. Negative for confusion, dysphoric mood and sleep disturbance. The patient is nervous/anxious and is hyperactive. Physical Exam  Vitals and nursing note reviewed. Constitutional:       General: He is active. He is not in acute distress. Appearance: He is well-developed. HENT:      Head: Normocephalic. Right Ear: Tympanic membrane and external ear normal.      Left Ear: Tympanic membrane and external ear normal.      Nose: Nose normal. No congestion. Mouth/Throat:      Mouth: Mucous membranes are moist.      Pharynx: Oropharynx is clear. Tonsils: No tonsillar exudate. Eyes:      General:         Right eye: No discharge. Left eye: No discharge. Conjunctiva/sclera: Conjunctivae normal.   Cardiovascular:      Rate and Rhythm: Normal rate and regular rhythm. Pulses: Pulses are strong. Heart sounds: S1 normal and S2 normal. No murmur heard. Pulmonary:      Effort: Pulmonary effort is normal. No respiratory distress. Breath sounds: Normal breath sounds. No stridor. No wheezing. Abdominal:      General: Bowel sounds are normal.      Palpations: Abdomen is soft. Tenderness: There is no abdominal tenderness. There is no guarding or rebound. Hernia: No hernia is present.    Musculoskeletal: General: No deformity or signs of injury. Normal range of motion. Cervical back: Normal range of motion and neck supple. Lymphadenopathy:      Cervical: No cervical adenopathy. Skin:     General: Skin is warm and dry. Capillary Refill: Capillary refill takes less than 2 seconds. Coloration: Skin is not pale. Neurological:      General: No focal deficit present. Mental Status: He is alert and oriented for age. Coordination: Coordination normal.   Psychiatric:         Attention and Perception: He is inattentive. Mood and Affect: Mood is anxious. Speech: Speech normal.         Behavior: Behavior is hyperactive. Behavior is not aggressive, withdrawn or combative. Thought Content: Thought content normal.         Cognition and Memory: Cognition and memory normal.         Judgment: Judgment is impulsive. An electronic signature was used to authenticate this note.     --MARQUITA Pham - CNP PAST MEDICAL HISTORY:  Hypothyroidism     Vertigo

## 2023-06-23 NOTE — ED PROVIDER NOTE - PHYSICAL EXAMINATION
VITAL SIGNS: I have reviewed nursing notes and confirm.  CONSTITUTIONAL: well-appearing, non-toxic, NAD  SKIN: Warm dry, normal skin turgor  HEAD: NCAT  EYES: EOMI, PERRLA, no scleral icterus  ENT: Moist mucous membranes, normal pharynx with no erythema or exudates  NECK: Supple; non tender. Full ROM.   CARD: RRR, no murmurs, rubs or gallops  RESP: clear to ausculation b/l.  No rales, rhonchi, or wheezing.  ABD: soft, + BS, non-tender, non-distended, no rebound or guarding. No CVA tenderness  EXT: Full ROM, no bony tenderness, no pedal edema, no calf tenderness  NEURO: normal motor. normal sensory. CN II-XII intact. Normal gait.  PSYCH: Cooperative, appropriate.

## 2023-06-24 PROBLEM — E03.9 HYPOTHYROIDISM, UNSPECIFIED: Chronic | Status: ACTIVE | Noted: 2023-02-23

## 2023-06-24 PROBLEM — R42 DIZZINESS AND GIDDINESS: Chronic | Status: ACTIVE | Noted: 2023-02-23

## 2024-01-04 ENCOUNTER — APPOINTMENT (OUTPATIENT)
Dept: PULMONOLOGY | Facility: CLINIC | Age: 69
End: 2024-01-04
Payer: MEDICARE

## 2024-01-04 VITALS
OXYGEN SATURATION: 98 % | WEIGHT: 144 LBS | DIASTOLIC BLOOD PRESSURE: 60 MMHG | SYSTOLIC BLOOD PRESSURE: 128 MMHG | HEART RATE: 72 BPM | BODY MASS INDEX: 22.6 KG/M2 | HEIGHT: 67 IN

## 2024-01-04 DIAGNOSIS — K21.9 GASTRO-ESOPHAGEAL REFLUX DISEASE W/OUT ESOPHAGITIS: ICD-10-CM

## 2024-01-04 DIAGNOSIS — Z86.39 PERSONAL HISTORY OF OTHER ENDOCRINE, NUTRITIONAL AND METABOLIC DISEASE: ICD-10-CM

## 2024-01-04 PROCEDURE — 99203 OFFICE O/P NEW LOW 30 MIN: CPT

## 2024-01-04 NOTE — HISTORY OF PRESENT ILLNESS
[TextBox_4] : 68 years old presented for above went to the emergency room February last year for vertigo underwent workup including CAT scan of her chest showed right lower lobe 4 mm nodule she came in for follow-up visit.  Remote history of smoking.  No history of lung disease.  No personal history of cancer.  Aunt with lung cancer.  Reports having twice COVID was asymptomatic.  No old CAT scan.

## 2024-01-04 NOTE — DISCUSSION/SUMMARY
[FreeTextEntry1] : INCIDENTAL LUNG NODULE NON SMOKER FAMILY HX OF LUNG CANCER/ AUNT REPEAT CT CT IMAGES 2/23 NOTED

## 2024-01-23 ENCOUNTER — RESULT REVIEW (OUTPATIENT)
Age: 69
End: 2024-01-23

## 2024-01-23 ENCOUNTER — OUTPATIENT (OUTPATIENT)
Dept: OUTPATIENT SERVICES | Facility: HOSPITAL | Age: 69
LOS: 1 days | End: 2024-01-23
Payer: MEDICARE

## 2024-01-23 DIAGNOSIS — Z12.2 ENCOUNTER FOR SCREENING FOR MALIGNANT NEOPLASM OF RESPIRATORY ORGANS: ICD-10-CM

## 2024-01-23 DIAGNOSIS — Z00.8 ENCOUNTER FOR OTHER GENERAL EXAMINATION: ICD-10-CM

## 2024-01-23 PROCEDURE — 71250 CT THORAX DX C-: CPT

## 2024-01-23 PROCEDURE — 71250 CT THORAX DX C-: CPT | Mod: 26

## 2024-01-24 DIAGNOSIS — Z12.2 ENCOUNTER FOR SCREENING FOR MALIGNANT NEOPLASM OF RESPIRATORY ORGANS: ICD-10-CM

## 2024-05-28 ENCOUNTER — EMERGENCY (EMERGENCY)
Facility: HOSPITAL | Age: 69
LOS: 0 days | Discharge: HOME | End: 2024-05-28
Attending: EMERGENCY MEDICINE
Payer: MEDICARE

## 2024-05-28 VITALS
WEIGHT: 139.99 LBS | DIASTOLIC BLOOD PRESSURE: 86 MMHG | OXYGEN SATURATION: 99 % | TEMPERATURE: 98 F | RESPIRATION RATE: 16 BRPM | HEART RATE: 61 BPM | SYSTOLIC BLOOD PRESSURE: 122 MMHG

## 2024-05-28 VITALS
TEMPERATURE: 98 F | HEART RATE: 63 BPM | SYSTOLIC BLOOD PRESSURE: 121 MMHG | OXYGEN SATURATION: 99 % | RESPIRATION RATE: 16 BRPM | DIASTOLIC BLOOD PRESSURE: 75 MMHG

## 2024-05-28 DIAGNOSIS — R10.13 EPIGASTRIC PAIN: ICD-10-CM

## 2024-05-28 DIAGNOSIS — R11.10 VOMITING, UNSPECIFIED: ICD-10-CM

## 2024-05-28 DIAGNOSIS — R10.9 UNSPECIFIED ABDOMINAL PAIN: ICD-10-CM

## 2024-05-28 DIAGNOSIS — E86.0 DEHYDRATION: ICD-10-CM

## 2024-05-28 DIAGNOSIS — E06.3 AUTOIMMUNE THYROIDITIS: ICD-10-CM

## 2024-05-28 DIAGNOSIS — K21.9 GASTRO-ESOPHAGEAL REFLUX DISEASE WITHOUT ESOPHAGITIS: ICD-10-CM

## 2024-05-28 DIAGNOSIS — Z87.891 PERSONAL HISTORY OF NICOTINE DEPENDENCE: ICD-10-CM

## 2024-05-28 LAB
ALBUMIN SERPL ELPH-MCNC: 4.4 G/DL — SIGNIFICANT CHANGE UP (ref 3.5–5.2)
ALP SERPL-CCNC: 129 U/L — HIGH (ref 30–115)
ALT FLD-CCNC: 9 U/L — SIGNIFICANT CHANGE UP (ref 0–41)
ANION GAP SERPL CALC-SCNC: 11 MMOL/L — SIGNIFICANT CHANGE UP (ref 7–14)
AST SERPL-CCNC: 22 U/L — SIGNIFICANT CHANGE UP (ref 0–41)
BASOPHILS # BLD AUTO: 0.04 K/UL — SIGNIFICANT CHANGE UP (ref 0–0.2)
BASOPHILS NFR BLD AUTO: 0.4 % — SIGNIFICANT CHANGE UP (ref 0–1)
BILIRUB DIRECT SERPL-MCNC: <0.2 MG/DL — SIGNIFICANT CHANGE UP (ref 0–0.3)
BILIRUB INDIRECT FLD-MCNC: >0.3 MG/DL — SIGNIFICANT CHANGE UP (ref 0.2–1.2)
BILIRUB SERPL-MCNC: 0.5 MG/DL — SIGNIFICANT CHANGE UP (ref 0.2–1.2)
BUN SERPL-MCNC: 16 MG/DL — SIGNIFICANT CHANGE UP (ref 10–20)
CALCIUM SERPL-MCNC: 9.5 MG/DL — SIGNIFICANT CHANGE UP (ref 8.4–10.5)
CHLORIDE SERPL-SCNC: 102 MMOL/L — SIGNIFICANT CHANGE UP (ref 98–110)
CO2 SERPL-SCNC: 25 MMOL/L — SIGNIFICANT CHANGE UP (ref 17–32)
CREAT SERPL-MCNC: 0.8 MG/DL — SIGNIFICANT CHANGE UP (ref 0.7–1.5)
EGFR: 80 ML/MIN/1.73M2 — SIGNIFICANT CHANGE UP
EOSINOPHIL # BLD AUTO: 0 K/UL — SIGNIFICANT CHANGE UP (ref 0–0.7)
EOSINOPHIL NFR BLD AUTO: 0 % — SIGNIFICANT CHANGE UP (ref 0–8)
GLUCOSE SERPL-MCNC: 160 MG/DL — HIGH (ref 70–99)
HCT VFR BLD CALC: 42.9 % — SIGNIFICANT CHANGE UP (ref 37–47)
HGB BLD-MCNC: 14.4 G/DL — SIGNIFICANT CHANGE UP (ref 12–16)
IMM GRANULOCYTES NFR BLD AUTO: 0.2 % — SIGNIFICANT CHANGE UP (ref 0.1–0.3)
LIDOCAIN IGE QN: 16 U/L — SIGNIFICANT CHANGE UP (ref 7–60)
LYMPHOCYTES # BLD AUTO: 0.56 K/UL — LOW (ref 1.2–3.4)
LYMPHOCYTES # BLD AUTO: 5.7 % — LOW (ref 20.5–51.1)
MCHC RBC-ENTMCNC: 28.9 PG — SIGNIFICANT CHANGE UP (ref 27–31)
MCHC RBC-ENTMCNC: 33.6 G/DL — SIGNIFICANT CHANGE UP (ref 32–37)
MCV RBC AUTO: 86 FL — SIGNIFICANT CHANGE UP (ref 81–99)
MONOCYTES # BLD AUTO: 0.3 K/UL — SIGNIFICANT CHANGE UP (ref 0.1–0.6)
MONOCYTES NFR BLD AUTO: 3 % — SIGNIFICANT CHANGE UP (ref 1.7–9.3)
NEUTROPHILS # BLD AUTO: 8.93 K/UL — HIGH (ref 1.4–6.5)
NEUTROPHILS NFR BLD AUTO: 90.7 % — HIGH (ref 42.2–75.2)
NRBC # BLD: 0 /100 WBCS — SIGNIFICANT CHANGE UP (ref 0–0)
PLATELET # BLD AUTO: 199 K/UL — SIGNIFICANT CHANGE UP (ref 130–400)
PMV BLD: 11.5 FL — HIGH (ref 7.4–10.4)
POTASSIUM SERPL-MCNC: 4.5 MMOL/L — SIGNIFICANT CHANGE UP (ref 3.5–5)
POTASSIUM SERPL-SCNC: 4.5 MMOL/L — SIGNIFICANT CHANGE UP (ref 3.5–5)
PROT SERPL-MCNC: 6.7 G/DL — SIGNIFICANT CHANGE UP (ref 6–8)
RBC # BLD: 4.99 M/UL — SIGNIFICANT CHANGE UP (ref 4.2–5.4)
RBC # FLD: 13 % — SIGNIFICANT CHANGE UP (ref 11.5–14.5)
SODIUM SERPL-SCNC: 138 MMOL/L — SIGNIFICANT CHANGE UP (ref 135–146)
WBC # BLD: 9.85 K/UL — SIGNIFICANT CHANGE UP (ref 4.8–10.8)
WBC # FLD AUTO: 9.85 K/UL — SIGNIFICANT CHANGE UP (ref 4.8–10.8)

## 2024-05-28 PROCEDURE — 99284 EMERGENCY DEPT VISIT MOD MDM: CPT | Mod: 25

## 2024-05-28 PROCEDURE — 80048 BASIC METABOLIC PNL TOTAL CA: CPT

## 2024-05-28 PROCEDURE — 36415 COLL VENOUS BLD VENIPUNCTURE: CPT

## 2024-05-28 PROCEDURE — 80076 HEPATIC FUNCTION PANEL: CPT

## 2024-05-28 PROCEDURE — 96375 TX/PRO/DX INJ NEW DRUG ADDON: CPT

## 2024-05-28 PROCEDURE — 85025 COMPLETE CBC W/AUTO DIFF WBC: CPT

## 2024-05-28 PROCEDURE — 83690 ASSAY OF LIPASE: CPT

## 2024-05-28 PROCEDURE — 96374 THER/PROPH/DIAG INJ IV PUSH: CPT

## 2024-05-28 PROCEDURE — 99285 EMERGENCY DEPT VISIT HI MDM: CPT

## 2024-05-28 PROCEDURE — 93005 ELECTROCARDIOGRAM TRACING: CPT

## 2024-05-28 PROCEDURE — 93010 ELECTROCARDIOGRAM REPORT: CPT

## 2024-05-28 RX ORDER — SUCRALFATE 1 G
1 TABLET ORAL ONCE
Refills: 0 | Status: COMPLETED | OUTPATIENT
Start: 2024-05-28 | End: 2024-05-28

## 2024-05-28 RX ORDER — ONDANSETRON 8 MG/1
4 TABLET, FILM COATED ORAL ONCE
Refills: 0 | Status: COMPLETED | OUTPATIENT
Start: 2024-05-28 | End: 2024-05-28

## 2024-05-28 RX ORDER — SUCRALFATE 1 G
1 TABLET ORAL
Qty: 56 | Refills: 0
Start: 2024-05-28 | End: 2024-06-10

## 2024-05-28 RX ORDER — FAMOTIDINE 10 MG/ML
20 INJECTION INTRAVENOUS ONCE
Refills: 0 | Status: COMPLETED | OUTPATIENT
Start: 2024-05-28 | End: 2024-05-28

## 2024-05-28 RX ORDER — ONDANSETRON 8 MG/1
1 TABLET, FILM COATED ORAL
Qty: 9 | Refills: 0
Start: 2024-05-28 | End: 2024-05-30

## 2024-05-28 RX ORDER — SODIUM CHLORIDE 9 MG/ML
1000 INJECTION INTRAMUSCULAR; INTRAVENOUS; SUBCUTANEOUS ONCE
Refills: 0 | Status: COMPLETED | OUTPATIENT
Start: 2024-05-28 | End: 2024-05-28

## 2024-05-28 RX ADMIN — Medication 30 MILLILITER(S): at 21:53

## 2024-05-28 RX ADMIN — Medication 1 GRAM(S): at 21:53

## 2024-05-28 RX ADMIN — SODIUM CHLORIDE 1000 MILLILITER(S): 9 INJECTION INTRAMUSCULAR; INTRAVENOUS; SUBCUTANEOUS at 21:53

## 2024-05-28 RX ADMIN — FAMOTIDINE 20 MILLIGRAM(S): 10 INJECTION INTRAVENOUS at 21:54

## 2024-05-28 RX ADMIN — ONDANSETRON 4 MILLIGRAM(S): 8 TABLET, FILM COATED ORAL at 21:53

## 2024-05-28 NOTE — ED PROVIDER NOTE - NSFOLLOWUPINSTRUCTIONS_ED_ALL_ED_FT
Abdominal Pain    Many things can cause abdominal pain. Many times, abdominal pain is not caused by a disease and will improve without treatment. Your health care provider will do a physical exam to determine if there is a dangerous cause of your pain; blood tests and imaging may help determine the cause of your pain. However, in many cases, no cause may be found and you may need further testing as an outpatient. Monitor your abdominal pain for any changes.     SEEK IMMEDIATE MEDICAL CARE IF YOU HAVE ANY OF THE FOLLOWING SYMPTOMS: worsening abdominal pain, uncontrollable vomiting, profuse diarrhea, inability to have bowel movements or pass gas, black or bloody stools, fever accompanying chest pain or back pain, or fainting. These symptoms may represent a serious problem that is an emergency. Do not wait to see if the symptoms will go away. Get medical help right away. Call 911 and do not drive yourself to the hospital.     Gastritis    Gastritis is soreness and swelling (inflammation) of the lining of the stomach. Gastritis can develop as a sudden onset (acute) or long-term (chronic) condition. If gastritis is not treated, it can lead to stomach bleeding and ulcers. Causes include viral and bacterial infections, excessive alcohol consumption, tobacco use, or certain medications. Symptoms include abdominal pain or burning especially after eating, nausea, vomiting. Avoid foods or drinks that make your symptoms worse such as caffeine, chocolate, spicy foods, acidic foods, alcohol.    SEEK IMMEDIATE MEDICAL CARE IF YOU HAVE THE FOLLOWING SYMPTOMS: black or bloody stools, blood or coffee-ground-colored vomitus, worsening abdominal pain, fever, or inability to keep fluids down.

## 2024-05-28 NOTE — ED PROVIDER NOTE - CLINICAL SUMMARY MEDICAL DECISION MAKING FREE TEXT BOX
Labs and EKG are normal -- the patient continues to be pain free and tolerate PO. Suspect exacerbation of GERD vs new dx of gastritis vs PUD. Has no signs of perforated or bleeding ulcer, no indication for imaging at this time.    Advised on sx monitoring, use of carafate, GI follow up, return precautions.

## 2024-05-28 NOTE — ED PROVIDER NOTE - NSDCPRINTRESULTS_ED_ALL_ED
"29 year old  Chief Complaint   Patient presents with    Physical       Blood pressure 121/81, pulse 86, temperature 98.1  F (36.7  C), temperature source Skin, resp. rate 15, height 1.63 m (5' 4.17\"), weight 129.3 kg (285 lb), SpO2 96 %. Body mass index is 48.66 kg/m .  Patient Active Problem List   Diagnosis    Depression    CAROLINE (generalized anxiety disorder)    Migraine with aura and without status migrainosus, not intractable    Non morbid obesity due to excess calories    Morbid obesity (H)       Wt Readings from Last 2 Encounters:   08/15/23 129.3 kg (285 lb)   12/16/22 129.3 kg (285 lb)     BP Readings from Last 3 Encounters:   08/15/23 121/81   08/14/23 117/81   04/24/23 136/85         Current Outpatient Medications   Medication    buPROPion (WELLBUTRIN XL) 150 MG 24 hr tablet    omeprazole (PRILOSEC) 20 MG DR capsule    sertraline (ZOLOFT) 50 MG tablet    vitamin C with B complex (B COMPLEX-C) tablet    albuterol (PROAIR HFA/PROVENTIL HFA/VENTOLIN HFA) 108 (90 Base) MCG/ACT inhaler    fluticasone (FLONASE) 50 MCG/ACT nasal spray    levonorgestrel (KYLEENA) 19.5 MG IUD    sodium chloride (OCEAN) 0.65 % nasal spray     No current facility-administered medications for this visit.       Social History     Tobacco Use    Smoking status: Never    Smokeless tobacco: Never   Vaping Use    Vaping Use: Never used   Substance Use Topics    Alcohol use: Yes     Comment: less than 1 drink a week.    Drug use: Yes     Types: Other     Comment: Delta 8 gummie, 5 mg, 4 times a week.       Health Maintenance Due   Topic Date Due    ADVANCE CARE PLANNING  Never done       No results found for: PAP      August 15, 2023 2:21 PM    "
Patient requests all Lab, Cardiology, and Radiology Results on their Discharge Instructions

## 2024-05-28 NOTE — ED PROVIDER NOTE - PHYSICAL EXAMINATION
VITAL SIGNS: I have reviewed nursing notes and confirm.  CONSTITUTIONAL: Well-developed; well-nourished; in no acute distress.  SKIN: Skin exam is warm and dry, no acute rash.  HEAD: Normocephalic; atraumatic.  EYES: PERRL, EOM intact; conjunctiva and sclera clear.  ENT: No nasal discharge; airway clear. slightly dry MM.  NECK: Supple; non tender.  CARD: S1, S2 normal; no murmurs, gallops, or rubs. Regular rate and rhythm.  RESP: No wheezes, rales or rhonchi.  ABD: Normal bowel sounds; soft; non-distended; non-tender  EXT: Normal ROM.  NEURO: Alert, oriented. Grossly unremarkable. No focal deficits.  PSYCH: Cooperative, appropriate.

## 2024-05-28 NOTE — ED ADULT TRIAGE NOTE - CHIEF COMPLAINT QUOTE
BIBA for abdominal pain and vomiting starting today, pt states she took a gas-x & a digestive enzyme

## 2024-05-28 NOTE — ED PROVIDER NOTE - PATIENT PORTAL LINK FT
You can access the FollowMyHealth Patient Portal offered by [facility] by registering at the following website: [email or web address]. By joining [employer]’s FollowMyHealth portal, you will also be able to view your health information using other applications (apps) compatible with our system.

## 2024-05-28 NOTE — ED PROVIDER NOTE - OBJECTIVE STATEMENT
69 yo F, hx of vertigo on prn meclizine, GERD, following with Dr. Nog, formerly on pepcid but stopped daily use due to fear of developing dementia, Hashimoto's thyroiditis on levothyroxine, here for assessment of epigastric burning throughout the day today, multiple episodes of NBNB vomiting. Pain resolved, no longer feeling nauseous but notes she feels dehydrated and diffusely weak.     No CP, dyspnea, fever, chills. No sick contacts. No diarrhea.

## 2024-05-31 ENCOUNTER — INPATIENT (INPATIENT)
Facility: HOSPITAL | Age: 69
LOS: 1 days | Discharge: HOME | DRG: 390 | End: 2024-06-02
Attending: SURGERY | Admitting: SURGERY
Payer: MEDICARE

## 2024-05-31 VITALS
TEMPERATURE: 98 F | RESPIRATION RATE: 18 BRPM | OXYGEN SATURATION: 98 % | SYSTOLIC BLOOD PRESSURE: 149 MMHG | DIASTOLIC BLOOD PRESSURE: 87 MMHG | HEART RATE: 71 BPM | HEIGHT: 64 IN | WEIGHT: 139.99 LBS

## 2024-05-31 PROCEDURE — 99285 EMERGENCY DEPT VISIT HI MDM: CPT | Mod: GC

## 2024-05-31 PROCEDURE — 93010 ELECTROCARDIOGRAM REPORT: CPT | Mod: 76

## 2024-05-31 NOTE — ED ADULT TRIAGE NOTE - CHIEF COMPLAINT QUOTE
The patient is a 49y Male complaining of chest pain.
endoscope this morning, have epigastric pain  and unable to ingest anything without vomiting

## 2024-06-01 DIAGNOSIS — K56.609 UNSPECIFIED INTESTINAL OBSTRUCTION, UNSPECIFIED AS TO PARTIAL VERSUS COMPLETE OBSTRUCTION: ICD-10-CM

## 2024-06-01 DIAGNOSIS — Z90.49 ACQUIRED ABSENCE OF OTHER SPECIFIED PARTS OF DIGESTIVE TRACT: Chronic | ICD-10-CM

## 2024-06-01 LAB
ALBUMIN SERPL ELPH-MCNC: 4.2 G/DL — SIGNIFICANT CHANGE UP (ref 3.5–5.2)
ALP SERPL-CCNC: 101 U/L — SIGNIFICANT CHANGE UP (ref 30–115)
ALT FLD-CCNC: 9 U/L — SIGNIFICANT CHANGE UP (ref 0–41)
ANION GAP SERPL CALC-SCNC: 10 MMOL/L — SIGNIFICANT CHANGE UP (ref 7–14)
ANION GAP SERPL CALC-SCNC: 19 MMOL/L — HIGH (ref 7–14)
AST SERPL-CCNC: 22 U/L — SIGNIFICANT CHANGE UP (ref 0–41)
BASOPHILS # BLD AUTO: 0.02 K/UL — SIGNIFICANT CHANGE UP (ref 0–0.2)
BASOPHILS # BLD AUTO: 0.03 K/UL — SIGNIFICANT CHANGE UP (ref 0–0.2)
BASOPHILS NFR BLD AUTO: 0.3 % — SIGNIFICANT CHANGE UP (ref 0–1)
BASOPHILS NFR BLD AUTO: 0.4 % — SIGNIFICANT CHANGE UP (ref 0–1)
BILIRUB SERPL-MCNC: 0.5 MG/DL — SIGNIFICANT CHANGE UP (ref 0.2–1.2)
BUN SERPL-MCNC: 11 MG/DL — SIGNIFICANT CHANGE UP (ref 10–20)
BUN SERPL-MCNC: 11 MG/DL — SIGNIFICANT CHANGE UP (ref 10–20)
CALCIUM SERPL-MCNC: 9 MG/DL — SIGNIFICANT CHANGE UP (ref 8.4–10.5)
CALCIUM SERPL-MCNC: 9.1 MG/DL — SIGNIFICANT CHANGE UP (ref 8.4–10.5)
CHLORIDE SERPL-SCNC: 102 MMOL/L — SIGNIFICANT CHANGE UP (ref 98–110)
CHLORIDE SERPL-SCNC: 102 MMOL/L — SIGNIFICANT CHANGE UP (ref 98–110)
CO2 SERPL-SCNC: 19 MMOL/L — SIGNIFICANT CHANGE UP (ref 17–32)
CO2 SERPL-SCNC: 25 MMOL/L — SIGNIFICANT CHANGE UP (ref 17–32)
CREAT SERPL-MCNC: 0.7 MG/DL — SIGNIFICANT CHANGE UP (ref 0.7–1.5)
CREAT SERPL-MCNC: 0.7 MG/DL — SIGNIFICANT CHANGE UP (ref 0.7–1.5)
EGFR: 94 ML/MIN/1.73M2 — SIGNIFICANT CHANGE UP
EGFR: 94 ML/MIN/1.73M2 — SIGNIFICANT CHANGE UP
EOSINOPHIL # BLD AUTO: 0 K/UL — SIGNIFICANT CHANGE UP (ref 0–0.7)
EOSINOPHIL # BLD AUTO: 0.04 K/UL — SIGNIFICANT CHANGE UP (ref 0–0.7)
EOSINOPHIL NFR BLD AUTO: 0 % — SIGNIFICANT CHANGE UP (ref 0–8)
EOSINOPHIL NFR BLD AUTO: 0.6 % — SIGNIFICANT CHANGE UP (ref 0–8)
GLUCOSE SERPL-MCNC: 105 MG/DL — HIGH (ref 70–99)
GLUCOSE SERPL-MCNC: 86 MG/DL — SIGNIFICANT CHANGE UP (ref 70–99)
HCT VFR BLD CALC: 36.8 % — LOW (ref 37–47)
HCT VFR BLD CALC: 39.6 % — SIGNIFICANT CHANGE UP (ref 37–47)
HGB BLD-MCNC: 12.4 G/DL — SIGNIFICANT CHANGE UP (ref 12–16)
HGB BLD-MCNC: 13.3 G/DL — SIGNIFICANT CHANGE UP (ref 12–16)
IMM GRANULOCYTES NFR BLD AUTO: 0.1 % — SIGNIFICANT CHANGE UP (ref 0.1–0.3)
IMM GRANULOCYTES NFR BLD AUTO: 0.3 % — SIGNIFICANT CHANGE UP (ref 0.1–0.3)
LACTATE SERPL-SCNC: 0.9 MMOL/L — SIGNIFICANT CHANGE UP (ref 0.7–2)
LACTATE SERPL-SCNC: 1.1 MMOL/L — SIGNIFICANT CHANGE UP (ref 0.7–2)
LIDOCAIN IGE QN: 18 U/L — SIGNIFICANT CHANGE UP (ref 7–60)
LYMPHOCYTES # BLD AUTO: 0.68 K/UL — LOW (ref 1.2–3.4)
LYMPHOCYTES # BLD AUTO: 1.36 K/UL — SIGNIFICANT CHANGE UP (ref 1.2–3.4)
LYMPHOCYTES # BLD AUTO: 21 % — SIGNIFICANT CHANGE UP (ref 20.5–51.1)
LYMPHOCYTES # BLD AUTO: 9.7 % — LOW (ref 20.5–51.1)
MAGNESIUM SERPL-MCNC: 1.8 MG/DL — SIGNIFICANT CHANGE UP (ref 1.8–2.4)
MCHC RBC-ENTMCNC: 28.9 PG — SIGNIFICANT CHANGE UP (ref 27–31)
MCHC RBC-ENTMCNC: 29.2 PG — SIGNIFICANT CHANGE UP (ref 27–31)
MCHC RBC-ENTMCNC: 33.6 G/DL — SIGNIFICANT CHANGE UP (ref 32–37)
MCHC RBC-ENTMCNC: 33.7 G/DL — SIGNIFICANT CHANGE UP (ref 32–37)
MCV RBC AUTO: 85.9 FL — SIGNIFICANT CHANGE UP (ref 81–99)
MCV RBC AUTO: 86.8 FL — SIGNIFICANT CHANGE UP (ref 81–99)
MONOCYTES # BLD AUTO: 0.28 K/UL — SIGNIFICANT CHANGE UP (ref 0.1–0.6)
MONOCYTES # BLD AUTO: 0.61 K/UL — HIGH (ref 0.1–0.6)
MONOCYTES NFR BLD AUTO: 4 % — SIGNIFICANT CHANGE UP (ref 1.7–9.3)
MONOCYTES NFR BLD AUTO: 9.4 % — HIGH (ref 1.7–9.3)
NEUTROPHILS # BLD AUTO: 4.42 K/UL — SIGNIFICANT CHANGE UP (ref 1.4–6.5)
NEUTROPHILS # BLD AUTO: 6.04 K/UL — SIGNIFICANT CHANGE UP (ref 1.4–6.5)
NEUTROPHILS NFR BLD AUTO: 68.4 % — SIGNIFICANT CHANGE UP (ref 42.2–75.2)
NEUTROPHILS NFR BLD AUTO: 85.8 % — HIGH (ref 42.2–75.2)
NRBC # BLD: 0 /100 WBCS — SIGNIFICANT CHANGE UP (ref 0–0)
NRBC # BLD: 0 /100 WBCS — SIGNIFICANT CHANGE UP (ref 0–0)
PHOSPHATE SERPL-MCNC: 3.3 MG/DL — SIGNIFICANT CHANGE UP (ref 2.1–4.9)
PLATELET # BLD AUTO: 176 K/UL — SIGNIFICANT CHANGE UP (ref 130–400)
PLATELET # BLD AUTO: 178 K/UL — SIGNIFICANT CHANGE UP (ref 130–400)
PMV BLD: 12 FL — HIGH (ref 7.4–10.4)
PMV BLD: 12.2 FL — HIGH (ref 7.4–10.4)
POTASSIUM SERPL-MCNC: 3.7 MMOL/L — SIGNIFICANT CHANGE UP (ref 3.5–5)
POTASSIUM SERPL-MCNC: 4.1 MMOL/L — SIGNIFICANT CHANGE UP (ref 3.5–5)
POTASSIUM SERPL-SCNC: 3.7 MMOL/L — SIGNIFICANT CHANGE UP (ref 3.5–5)
POTASSIUM SERPL-SCNC: 4.1 MMOL/L — SIGNIFICANT CHANGE UP (ref 3.5–5)
PROT SERPL-MCNC: 6.2 G/DL — SIGNIFICANT CHANGE UP (ref 6–8)
RBC # BLD: 4.24 M/UL — SIGNIFICANT CHANGE UP (ref 4.2–5.4)
RBC # BLD: 4.61 M/UL — SIGNIFICANT CHANGE UP (ref 4.2–5.4)
RBC # FLD: 12.9 % — SIGNIFICANT CHANGE UP (ref 11.5–14.5)
RBC # FLD: 13 % — SIGNIFICANT CHANGE UP (ref 11.5–14.5)
SODIUM SERPL-SCNC: 137 MMOL/L — SIGNIFICANT CHANGE UP (ref 135–146)
SODIUM SERPL-SCNC: 140 MMOL/L — SIGNIFICANT CHANGE UP (ref 135–146)
WBC # BLD: 6.47 K/UL — SIGNIFICANT CHANGE UP (ref 4.8–10.8)
WBC # BLD: 7.04 K/UL — SIGNIFICANT CHANGE UP (ref 4.8–10.8)
WBC # FLD AUTO: 6.47 K/UL — SIGNIFICANT CHANGE UP (ref 4.8–10.8)
WBC # FLD AUTO: 7.04 K/UL — SIGNIFICANT CHANGE UP (ref 4.8–10.8)

## 2024-06-01 PROCEDURE — 36415 COLL VENOUS BLD VENIPUNCTURE: CPT

## 2024-06-01 PROCEDURE — 80053 COMPREHEN METABOLIC PANEL: CPT

## 2024-06-01 PROCEDURE — 86900 BLOOD TYPING SEROLOGIC ABO: CPT

## 2024-06-01 PROCEDURE — 74177 CT ABD & PELVIS W/CONTRAST: CPT | Mod: 26,MC

## 2024-06-01 PROCEDURE — 86850 RBC ANTIBODY SCREEN: CPT

## 2024-06-01 PROCEDURE — 85027 COMPLETE CBC AUTOMATED: CPT

## 2024-06-01 PROCEDURE — 71045 X-RAY EXAM CHEST 1 VIEW: CPT | Mod: 26

## 2024-06-01 PROCEDURE — 74021 RADEX ABDOMEN 3+ VIEWS: CPT | Mod: 26

## 2024-06-01 PROCEDURE — 86901 BLOOD TYPING SEROLOGIC RH(D): CPT

## 2024-06-01 PROCEDURE — 80048 BASIC METABOLIC PNL TOTAL CA: CPT

## 2024-06-01 PROCEDURE — 74021 RADEX ABDOMEN 3+ VIEWS: CPT

## 2024-06-01 PROCEDURE — 83735 ASSAY OF MAGNESIUM: CPT

## 2024-06-01 PROCEDURE — C9113: CPT

## 2024-06-01 PROCEDURE — 85025 COMPLETE CBC W/AUTO DIFF WBC: CPT

## 2024-06-01 PROCEDURE — 93010 ELECTROCARDIOGRAM REPORT: CPT

## 2024-06-01 PROCEDURE — 84100 ASSAY OF PHOSPHORUS: CPT

## 2024-06-01 PROCEDURE — 83605 ASSAY OF LACTIC ACID: CPT

## 2024-06-01 PROCEDURE — 93005 ELECTROCARDIOGRAM TRACING: CPT

## 2024-06-01 RX ORDER — SODIUM CHLORIDE 9 MG/ML
1000 INJECTION INTRAMUSCULAR; INTRAVENOUS; SUBCUTANEOUS ONCE
Refills: 0 | Status: COMPLETED | OUTPATIENT
Start: 2024-06-01 | End: 2024-06-01

## 2024-06-01 RX ORDER — MORPHINE SULFATE 50 MG/1
4 CAPSULE, EXTENDED RELEASE ORAL ONCE
Refills: 0 | Status: DISCONTINUED | OUTPATIENT
Start: 2024-06-01 | End: 2024-06-01

## 2024-06-01 RX ORDER — LEVOTHYROXINE SODIUM 125 MCG
1 TABLET ORAL
Refills: 0 | DISCHARGE

## 2024-06-01 RX ORDER — IOHEXOL 300 MG/ML
30 INJECTION, SOLUTION INTRAVENOUS ONCE
Refills: 0 | Status: COMPLETED | OUTPATIENT
Start: 2024-06-01 | End: 2024-06-01

## 2024-06-01 RX ORDER — PANTOPRAZOLE SODIUM 20 MG/1
40 TABLET, DELAYED RELEASE ORAL DAILY
Refills: 0 | Status: DISCONTINUED | OUTPATIENT
Start: 2024-06-01 | End: 2024-06-02

## 2024-06-01 RX ORDER — ONDANSETRON 8 MG/1
4 TABLET, FILM COATED ORAL EVERY 8 HOURS
Refills: 0 | Status: DISCONTINUED | OUTPATIENT
Start: 2024-06-01 | End: 2024-06-02

## 2024-06-01 RX ORDER — SODIUM CHLORIDE 9 MG/ML
1000 INJECTION INTRAMUSCULAR; INTRAVENOUS; SUBCUTANEOUS
Refills: 0 | Status: DISCONTINUED | OUTPATIENT
Start: 2024-06-01 | End: 2024-06-01

## 2024-06-01 RX ORDER — SODIUM CHLORIDE 9 MG/ML
1000 INJECTION, SOLUTION INTRAVENOUS
Refills: 0 | Status: DISCONTINUED | OUTPATIENT
Start: 2024-06-01 | End: 2024-06-02

## 2024-06-01 RX ORDER — ONDANSETRON 8 MG/1
4 TABLET, FILM COATED ORAL ONCE
Refills: 0 | Status: COMPLETED | OUTPATIENT
Start: 2024-06-01 | End: 2024-06-01

## 2024-06-01 RX ORDER — ACETAMINOPHEN 500 MG
650 TABLET ORAL EVERY 6 HOURS
Refills: 0 | Status: DISCONTINUED | OUTPATIENT
Start: 2024-06-01 | End: 2024-06-02

## 2024-06-01 RX ORDER — ENOXAPARIN SODIUM 100 MG/ML
40 INJECTION SUBCUTANEOUS EVERY 24 HOURS
Refills: 0 | Status: DISCONTINUED | OUTPATIENT
Start: 2024-06-01 | End: 2024-06-02

## 2024-06-01 RX ORDER — LEVOTHYROXINE SODIUM 125 MCG
75 TABLET ORAL
Refills: 0 | Status: DISCONTINUED | OUTPATIENT
Start: 2024-06-01 | End: 2024-06-02

## 2024-06-01 RX ORDER — MORPHINE SULFATE 50 MG/1
2 CAPSULE, EXTENDED RELEASE ORAL EVERY 4 HOURS
Refills: 0 | Status: DISCONTINUED | OUTPATIENT
Start: 2024-06-01 | End: 2024-06-02

## 2024-06-01 RX ORDER — CHLORHEXIDINE GLUCONATE 213 G/1000ML
1 SOLUTION TOPICAL
Refills: 0 | Status: DISCONTINUED | OUTPATIENT
Start: 2024-06-01 | End: 2024-06-02

## 2024-06-01 RX ADMIN — ONDANSETRON 4 MILLIGRAM(S): 8 TABLET, FILM COATED ORAL at 00:54

## 2024-06-01 RX ADMIN — IOHEXOL 30 MILLILITER(S): 300 INJECTION, SOLUTION INTRAVENOUS at 09:17

## 2024-06-01 RX ADMIN — PANTOPRAZOLE SODIUM 40 MILLIGRAM(S): 20 TABLET, DELAYED RELEASE ORAL at 12:11

## 2024-06-01 RX ADMIN — SODIUM CHLORIDE 100 MILLILITER(S): 9 INJECTION, SOLUTION INTRAVENOUS at 12:12

## 2024-06-01 RX ADMIN — SODIUM CHLORIDE 1000 MILLILITER(S): 9 INJECTION INTRAMUSCULAR; INTRAVENOUS; SUBCUTANEOUS at 00:51

## 2024-06-01 RX ADMIN — MORPHINE SULFATE 4 MILLIGRAM(S): 50 CAPSULE, EXTENDED RELEASE ORAL at 01:06

## 2024-06-01 RX ADMIN — SODIUM CHLORIDE 100 MILLILITER(S): 9 INJECTION, SOLUTION INTRAVENOUS at 18:15

## 2024-06-01 RX ADMIN — CHLORHEXIDINE GLUCONATE 1 APPLICATION(S): 213 SOLUTION TOPICAL at 06:34

## 2024-06-01 NOTE — PROGRESS NOTE ADULT - SUBJECTIVE AND OBJECTIVE BOX
GENERAL SURGERY PROGRESS NOTE     KELLEY GLEZARET  68y    Overnight events:  Pain resolved, abdominal exam benign  Passing gas  Patient hungry requesting to eat, currently NPO  Pt refuses surgical intervention if needed at this time, prefers to wait until son arrives to discuss further operative diagnostic or therapeutic intervention  Pending follow up CT AP    T(F): 98.1 (06-01-24 @ 05:16), Max: 98.4 (06-01-24 @ 03:30)  HR: 70 (06-01-24 @ 05:16) (58 - 75)  BP: 108/56 (06-01-24 @ 05:16) (108/56 - 149/87)  ABP: --  ABP(mean): --  RR: 19 (06-01-24 @ 05:16) (18 - 19)  SpO2: 96% (06-01-24 @ 05:16) (96% - 98%)      PMH  PAST MEDICAL & SURGICAL HISTORY:  Hypothyroidism  Vertigo  History of appendectomy        Allergies  Allergies  No Known Allergies  Intolerances             DIET/FLUIDS: lactated ringers. 1000 milliLiter(s) IV Continuous <Continuous>      GI proph:  pantoprazole  Injectable 40 milliGRAM(s) IV Push daily    AC/ proph: enoxaparin Injectable 40 milliGRAM(s) SubCutaneous every 24 hours      PHYSICAL EXAM:  GENERAL: NAD, well-appearing  CHEST/LUNG: Clear to auscultation bilaterally  HEART: Regular rate and rhythm  ABDOMEN: Soft, Nontender, Nondistended;   EXTREMITIES:  No clubbing, cyanosis, or edema      LABS  Labs:  CAPILLARY BLOOD GLUCOSE                              13.3   7.04  )-----------( 178      ( 01 Jun 2024 01:03 )             39.6       Auto Neutrophil %: 85.8 % (06-01-24 @ 01:03)  Auto Immature Granulocyte %: 0.1 % (06-01-24 @ 01:03)    06-01    140  |  102  |  11  ----------------------------<  105<H>  4.1   |  19  |  0.7      Calcium: 9.1 mg/dL (06-01-24 @ 01:03)      LFTs:             6.2  | 0.5  | 22       ------------------[101     ( 01 Jun 2024 01:03 )  4.2  | x    | 9           Lipase:18     Amylase:x         Lactate, Blood: 1.1 mmol/L (06-01-24 @ 01:03)            Urinalysis Basic - ( 01 Jun 2024 01:03 )    Color: x / Appearance: x / SG: x / pH: x  Gluc: 105 mg/dL / Ketone: x  / Bili: x / Urobili: x   Blood: x / Protein: x / Nitrite: x   Leuk Esterase: x / RBC: x / WBC x   Sq Epi: x / Non Sq Epi: x / Bacteria: x            RADIOLOGY & ADDITIONAL TESTS:  Awaiting Repeat CTAP f/u w PO contrast     GENERAL SURGERY PROGRESS NOTE     KELLEY GLEZARET  68y    Overnight events:  Pain resolved, abdominal exam benign  Passing gas  Patient hungry requesting to eat, currently NPO  Pt refuses surgical intervention if needed at this time, prefers to wait until son arrives to discuss further operative diagnostic or therapeutic intervention  Pending follow up CT AP    T(F): 98.1 (06-01-24 @ 05:16), Max: 98.4 (06-01-24 @ 03:30)  HR: 70 (06-01-24 @ 05:16) (58 - 75)  BP: 108/56 (06-01-24 @ 05:16) (108/56 - 149/87)  ABP: --  ABP(mean): --  RR: 19 (06-01-24 @ 05:16) (18 - 19)  SpO2: 96% (06-01-24 @ 05:16) (96% - 98%)      PMH  PAST MEDICAL & SURGICAL HISTORY:  Hypothyroidism  Vertigo  History of appendectomy        Allergies  Allergies  No Known Allergies  Intolerances             DIET/FLUIDS: lactated ringers. 1000 milliLiter(s) IV Continuous <Continuous>      GI proph:  pantoprazole  Injectable 40 milliGRAM(s) IV Push daily    AC/ proph: enoxaparin Injectable 40 milliGRAM(s) SubCutaneous every 24 hours      PHYSICAL EXAM:  GENERAL: NAD, well-appearing  CHEST/LUNG: Clear to auscultation bilaterally  HEART: Regular rate and rhythm  ABDOMEN: Soft, Nontender, Nondistended;   EXTREMITIES:  No clubbing, cyanosis, or edema      LABS  Labs:  CAPILLARY BLOOD GLUCOSE                              13.3   7.04  )-----------( 178      ( 01 Jun 2024 01:03 )             39.6       Auto Neutrophil %: 85.8 % (06-01-24 @ 01:03)  Auto Immature Granulocyte %: 0.1 % (06-01-24 @ 01:03)    06-01    140  |  102  |  11  ----------------------------<  105<H>  4.1   |  19  |  0.7      Calcium: 9.1 mg/dL (06-01-24 @ 01:03)      LFTs:             6.2  | 0.5  | 22       ------------------[101     ( 01 Jun 2024 01:03 )  4.2  | x    | 9           Lipase:18     Amylase:x         Lactate, Blood: 1.1 mmol/L (06-01-24 @ 01:03)            Urinalysis Basic - ( 01 Jun 2024 01:03 )    Color: x / Appearance: x / SG: x / pH: x  Gluc: 105 mg/dL / Ketone: x  / Bili: x / Urobili: x   Blood: x / Protein: x / Nitrite: x   Leuk Esterase: x / RBC: x / WBC x   Sq Epi: x / Non Sq Epi: x / Bacteria: x            RADIOLOGY & ADDITIONAL TESTS:  Awaiting small bowel series

## 2024-06-01 NOTE — PROGRESS NOTE ADULT - ASSESSMENT
69yo F with a pmhx of hypothyroidism, GERD, appendectomy x years ago, everal days described as located in midabdomen, constant, moderate to severe, and relieved after pain meds and IVF in ED. Pt reports associated nausea and vomiting multiple times unable to tolerate any po intake liquid or food. Last BM was yesterday and she noticed some blood at that time but unsure where it came from. Pt reports having endoscopy by DR Ngo two days ago but her sx present before the procedure. Pt denies associated fevers, chills.    #SBO secondary to SB intussusception, patient now asymptomatic, hungry and asking to eat, passing gas, pending follow up imaging to assess possible resolution  -keep NPO, if resolved intussusception can resume diet  -pt refusing surgery at this time, follow up conversation with patient and son  -IVF  -serial exam and lab  -pain meds prn  -consider NGT if intussusception recurrs or symptoms worsen  -dvt and GI prophylaxis      Discussed with Dr. Rahman     67yo F with a pmhx of hypothyroidism, GERD, appendectomy x years ago, everal days described as located in midabdomen, constant, moderate to severe, and relieved after pain meds and IVF in ED. Pt reports associated nausea and vomiting multiple times unable to tolerate any po intake liquid or food. Last BM was yesterday and she noticed some blood at that time but unsure where it came from. Pt reports having endoscopy by DR Ngo two days ago but her sx present before the procedure. Pt denies associated fevers, chills.    #SBO secondary to SB intussusception, patient now asymptomatic, hungry and asking to eat, passing gas, pending follow up imaging to assess possible resolution  -keep NPO, consider liquids this PM if continued to be asymptomatic and clinically resolved   -pt refusing surgery at this time, follow up conversation with patient and son  -Awaiting small bowel series  -IVF  -serial exam and lab  -pain meds prn  -consider NGT if intussusception recurrs or symptoms worsen  -dvt and GI prophylaxis      Discussed with Dr. Rahman

## 2024-06-01 NOTE — H&P ADULT - ASSESSMENT
Pt is a 69yo F with a pmhx of hypothyroidism, GERD, appendectomy x years ago, everal days described as located in midabdomen, constant, moderate to severe, and relieved after pain meds and IVF in ED. Pt reports associated nausea and vomiting multiple times unable to tolerate any po intake liquid or food. Last BM was yesterday and she noticed some blood at that time but unsure where it came from. Pt reports having endoscopy by DR Ngo two days ago but her sx present before the procedure. Pt denies associated fevers, chills.    #SBO secondary to SB intussusception  -NPO  -IVF  -serial exam and lab  -pain meds prn  -NGT placement if sx worsen  -dvt and GI prophylaxis  -will d/w attending

## 2024-06-01 NOTE — PATIENT PROFILE ADULT - HEALTH LITERACY
Justice Infante is a 30 y.o. male here today for TMS Treatment     Current medicines (including changes today)  Current Outpatient Medications   Medication Sig Dispense Refill   • venlafaxine XR (EFFEXOR XR) 37.5 MG CAPSULE SR 24 HR Take 1 capsule by mouth every day. Combine with 300 mg of Effexor XR for a total of 337.5 mg 90 capsule 0   • propranolol (INDERAL) 10 MG Tab Take 1 to 2 tablets by mouth once a day for performance anxiety, take 1 hour prior to performance 60 tablet 0   • venlafaxine (EFFEXOR-XR) 150 MG extended-release capsule Take 2 tablets by mouth once a day 180 Cap 0   • mirtazapine (REMERON) 30 MG Tab tablet Take 1 Tab by mouth every bedtime. 90 Tab 0   • emtricitabine-tenofovir (TRUVADA) 200-300 MG per tablet Take 1 Tab by mouth every evening.     • Multiple Vitamins-Minerals (MENS MULTIVITAMIN) Tab Take 1 Tab by mouth every evening.     • simethicone (MYLICON) 80 MG Chew Tab Chew 240 mg one time as needed for Flatulence. 3 tablets = 240 mg.     • emtricitabine-TAF (DESCOVY) 200-25 mg Tab tablet Take 1 Tab by mouth every day. (Patient taking differently: Take 1 Tab by mouth every day. New medication NOT STARTED) 90 Tab 3     No current facility-administered medications for this encounter.         Pretreatment Questions    Any Metal in or around head? No  Taking current medications as prescribed? Yes  Change in medical history or medications since patient last saw physician? No  Change in meals or caffeine use? No  Change in sleep pattern? No   Did patient pre-treat today with any over the counter medication? No   Any problems or changes since patient's last session? No    Was PHQ9 completed today?  No   If yes, Score:       Treatment  Were ear plugs given and inserted by patient? Yes  Was patient positioned correctly? Yes  Did patient have any finger or hand twitching? No  Did patient have any facial discomfort? No  Did patient have any changes to SMT? No  How did patient tolerate  treatment? Patient tolerated well.     Did patient retrieve any belongings they removed before treatment? Yes    See attached Session Report scanned into chart.    [unfilled]     no

## 2024-06-01 NOTE — H&P ADULT - HISTORY OF PRESENT ILLNESS
Pt is a 67yo F with a pmhx of hypothyroidism, GERD, appendectomy x years ago, everal days described as located in midabdomen, constant, moderate to severe, and relieved after pain meds and IVF in ED. Pt reports associated nausea and vomiting multiple times unable to tolerate any po intake liquid or food. Last BM was yesterday and she noticed some blood at that time but unsure where it came from. Pt reports having endoscopy by DR Ngo two days ago but her sx present before the procedure. Pt denies associated fevers, chills,

## 2024-06-01 NOTE — H&P ADULT - ATTENDING COMMENTS
Feeling better this morning,  Abdominal pain and nausea have resolved.  Passed some flatus this AM.  Abdomen os soft, NT, ND.  No palpable masses.  Discussed with the patient necessity of further work up of distal small bowel intussusception. Patient with small bowel intussusception, signs of SBO, abdominal pain and one episode of blood in stool.  Explained that she needs an exploration and possible small bowel resection. Offered to do it this morning. Patient declined. She wants to wait until her son gets to [facility] and discuss with him.  If exploration is delayed, localization of intussusception site might be more difficult on exploration (resolution of local small bowel edema etc). Will need further imaging, like Upper GI series with small bowel follow through vs MRI enterography.   Patient understands.

## 2024-06-01 NOTE — ED PROVIDER NOTE - CLINICAL SUMMARY MEDICAL DECISION MAKING FREE TEXT BOX
68-year-old female w past medical history of hypothyroidism and vertigo presents with persistent burning upper abdominal pain nonradiating with nausea and nb/nb vomiting for the past few days. Patient had an upper endoscopy performed w GI Dr Ngo earlier today, unsure of result.  Patient states she felt fine after the procedure but upon returning home complained of pain. pt received as sign out to follow on imaging and reevaluate. ct showed small bowel obstruction due to intussusception, reevaluation vitals stable no emesis. surgery consulted and accepted for admission for frequens abdominal exam to determine whether or not a procedure is needed to resolve the obstruction. pt agreeable with plan, ivf and pain meds given kept npo and admitted to surgery floor.

## 2024-06-01 NOTE — H&P ADULT - NSHPPHYSICALEXAM_GEN_ALL_CORE
Vital Signs Last 24 Hrs  T(C): 36.8 (31 May 2024 23:30), Max: 36.8 (31 May 2024 23:30)  T(F): 98.2 (31 May 2024 23:30), Max: 98.2 (31 May 2024 23:30)  HR: 71 (31 May 2024 23:30) (71 - 71)  BP: 149/87 (31 May 2024 23:30) (149/87 - 149/87)  RR: 18 (31 May 2024 23:30) (18 - 18)  SpO2: 98% (31 May 2024 23:30) (98% - 98%)    PHYSICAL EXAM:      Constitutional: A&Ox4, NAD  Respiratory: cta b/l  Cardiovascular: s1 s2 rrr  Gastrointestinal: soft, nontender, mild distension + bs no rebound or guarding  Genitourinary: no cva tenderness  Extremities: normal rom, no edema, calf tenderness  Neurological:no focal deficits  Skin: no rash

## 2024-06-01 NOTE — PATIENT PROFILE ADULT - FALL HARM RISK - UNIVERSAL INTERVENTIONS
Bed in lowest position, wheels locked, appropriate side rails in place/Call bell, personal items and telephone in reach/Instruct patient to call for assistance before getting out of bed or chair/Non-slip footwear when patient is out of bed/[place] to call system/Physically safe environment - no spills, clutter or unnecessary equipment/Purposeful Proactive Rounding/Room/bathroom lighting operational, light cord in reach

## 2024-06-01 NOTE — ED PROVIDER NOTE - PHYSICAL EXAMINATION
VITAL SIGNS: I have reviewed nursing notes and confirm.  CONSTITUTIONAL: non-toxic, well appearing  SKIN: no rash, no petechiae.  EYES: PERRL, pink conjunctiva, anicteric  ENT: tongue midline, no exudates, + dry MM  NECK: Supple; no meningismus  CARD: RRR, no murmurs, equal radial pulses bilaterally 2+  RESP: CTAB, no respiratory distress  ABD: Soft, non-distended, no peritoneal signs, + epigastric ttp w guarding   EXT: Normal ROM x4. No edema.   NEURO: Alert, oriented. no focal deficits   PSYCH: Cooperative, appropriate.

## 2024-06-01 NOTE — ED PROVIDER NOTE - OBJECTIVE STATEMENT
68-year-old female w past medical history of hypothyroidism and vertigo presents with persistent burning upper abdominal pain nonradiating with nausea and nb/nb vomiting for the past few days. Patient had an upper endoscopy performed w GI Dr Ngo earlier today, unsure of result.  Patient states she felt fine after the procedure but upon returning home complained of pain.  Patient notes she has been having this pain even prior to the procedure, but it is slightly worse at this time.  Poor appetite.  Patient does report she noticed some rectal bleeding as well today. + flatulence. Denies fever chest pain shortness of breath diarrhea constipation melena or dysuria.

## 2024-06-01 NOTE — ED PROVIDER NOTE - PROGRESS NOTE DETAILS
endorsed to Ad Authored by Dr. Woodward: surgery consulted for SBO Authored by Dr. Woodward: surg recommend maintenance fluids, npo, currently no episodes of vomiting in ED but if vomiting will place NGT

## 2024-06-01 NOTE — H&P ADULT - NSHPLABSRESULTS_GEN_ALL_CORE
13.3   7.04  )-----------( 178      ( 01 Jun 2024 01:03 )             39.6       06-01    140  |  102  |  11  ----------------------------<  105<H>  4.1   |  19  |  0.7    Ca    9.1      01 Jun 2024 01:03    TPro  6.2  /  Alb  4.2  /  TBili  0.5  /  DBili  x   /  AST  22  /  ALT  9   /  AlkPhos  101  06-01              Urinalysis Basic - ( 01 Jun 2024 01:03 )    Color: x / Appearance: x / SG: x / pH: x  Gluc: 105 mg/dL / Ketone: x  / Bili: x / Urobili: x   Blood: x / Protein: x / Nitrite: x   Leuk Esterase: x / RBC: x / WBC x   Sq Epi: x / Non Sq Epi: x / Bacteria: x        Lactate Trend  06-01 @ 01:03 Lactate:1.1           < from: CT Abdomen and Pelvis w/ IV Cont (06.01.24 @ 02:03) >    IMPRESSION:    Small bowel obstruction secondary to distal small bowel-small bowel   intussusception within pelvis.    Distal small bowel pathology unlikely related to coincidental recent   upper endoscopy.

## 2024-06-01 NOTE — ED PROVIDER NOTE - ATTENDING CONTRIBUTION TO CARE
68-year-old female above past medical history presents with persistent burning upper abdominal pain with nausea and vomiting, no melena, no fever, was seen in ED 3 days ago for same and discharged to follow-up with GI, actually did have an upper endoscopy earlier today and felt better afterwards but then pain returned, no fever, on exam vitals appreciated, nontoxic, conjunctive pink, dry mucous membranes, neck supple, cor regular, lungs clear, abdomen NABS, soft with epigastric TTP no G/R neg Vaca's, will check labs and imaging

## 2024-06-01 NOTE — H&P ADULT - NSICDXPASTMEDICALHX_GEN_ALL_CORE_FT
PAST MEDICAL HISTORY:  Hypothyroidism     Vertigo      Ivermectin Counseling:  Patient instructed to take medication on an empty stomach with a full glass of water.  Patient informed of potential adverse effects including but not limited to nausea, diarrhea, dizziness, itching, and swelling of the extremities or lymph nodes.  The patient verbalized understanding of the proper use and possible adverse effects of ivermectin.  All of the patient's questions and concerns were addressed.

## 2024-06-02 ENCOUNTER — TRANSCRIPTION ENCOUNTER (OUTPATIENT)
Age: 69
End: 2024-06-02

## 2024-06-02 VITALS
HEART RATE: 60 BPM | OXYGEN SATURATION: 97 % | SYSTOLIC BLOOD PRESSURE: 119 MMHG | DIASTOLIC BLOOD PRESSURE: 77 MMHG | TEMPERATURE: 98 F | RESPIRATION RATE: 18 BRPM

## 2024-06-02 LAB
ALBUMIN SERPL ELPH-MCNC: 3.7 G/DL — SIGNIFICANT CHANGE UP (ref 3.5–5.2)
ALP SERPL-CCNC: 93 U/L — SIGNIFICANT CHANGE UP (ref 30–115)
ALT FLD-CCNC: 9 U/L — SIGNIFICANT CHANGE UP (ref 0–41)
ANION GAP SERPL CALC-SCNC: 15 MMOL/L — HIGH (ref 7–14)
AST SERPL-CCNC: 20 U/L — SIGNIFICANT CHANGE UP (ref 0–41)
BILIRUB SERPL-MCNC: 0.6 MG/DL — SIGNIFICANT CHANGE UP (ref 0.2–1.2)
BLD GP AB SCN SERPL QL: SIGNIFICANT CHANGE UP
BUN SERPL-MCNC: 10 MG/DL — SIGNIFICANT CHANGE UP (ref 10–20)
CALCIUM SERPL-MCNC: 8.9 MG/DL — SIGNIFICANT CHANGE UP (ref 8.4–10.5)
CHLORIDE SERPL-SCNC: 102 MMOL/L — SIGNIFICANT CHANGE UP (ref 98–110)
CO2 SERPL-SCNC: 24 MMOL/L — SIGNIFICANT CHANGE UP (ref 17–32)
CREAT SERPL-MCNC: 0.7 MG/DL — SIGNIFICANT CHANGE UP (ref 0.7–1.5)
EGFR: 94 ML/MIN/1.73M2 — SIGNIFICANT CHANGE UP
GLUCOSE SERPL-MCNC: 64 MG/DL — LOW (ref 70–99)
HCT VFR BLD CALC: 37.8 % — SIGNIFICANT CHANGE UP (ref 37–47)
HGB BLD-MCNC: 12.8 G/DL — SIGNIFICANT CHANGE UP (ref 12–16)
MAGNESIUM SERPL-MCNC: 1.8 MG/DL — SIGNIFICANT CHANGE UP (ref 1.8–2.4)
MCHC RBC-ENTMCNC: 29.6 PG — SIGNIFICANT CHANGE UP (ref 27–31)
MCHC RBC-ENTMCNC: 33.9 G/DL — SIGNIFICANT CHANGE UP (ref 32–37)
MCV RBC AUTO: 87.3 FL — SIGNIFICANT CHANGE UP (ref 81–99)
NRBC # BLD: 0 /100 WBCS — SIGNIFICANT CHANGE UP (ref 0–0)
PLATELET # BLD AUTO: 174 K/UL — SIGNIFICANT CHANGE UP (ref 130–400)
PMV BLD: 12.2 FL — HIGH (ref 7.4–10.4)
POTASSIUM SERPL-MCNC: 3.9 MMOL/L — SIGNIFICANT CHANGE UP (ref 3.5–5)
POTASSIUM SERPL-SCNC: 3.9 MMOL/L — SIGNIFICANT CHANGE UP (ref 3.5–5)
PROT SERPL-MCNC: 5.8 G/DL — LOW (ref 6–8)
RBC # BLD: 4.33 M/UL — SIGNIFICANT CHANGE UP (ref 4.2–5.4)
RBC # FLD: 13 % — SIGNIFICANT CHANGE UP (ref 11.5–14.5)
SODIUM SERPL-SCNC: 141 MMOL/L — SIGNIFICANT CHANGE UP (ref 135–146)
WBC # BLD: 4.2 K/UL — LOW (ref 4.8–10.8)
WBC # FLD AUTO: 4.2 K/UL — LOW (ref 4.8–10.8)

## 2024-06-02 PROCEDURE — 99232 SBSQ HOSP IP/OBS MODERATE 35: CPT

## 2024-06-02 RX ADMIN — PANTOPRAZOLE SODIUM 40 MILLIGRAM(S): 20 TABLET, DELAYED RELEASE ORAL at 11:03

## 2024-06-02 RX ADMIN — Medication 75 MICROGRAM(S): at 05:21

## 2024-06-02 NOTE — DISCHARGE NOTE PROVIDER - PROVIDER TOKENS
PROVIDER:[TOKEN:[[id number]],FOLLOWUP:[1 week]],PROVIDER:[TOKEN:[[id number]],FOLLOWUP:[1 week]],PROVIDER:[TOKEN:[[id number]]]

## 2024-06-02 NOTE — PROGRESS NOTE ADULT - NS ATTEND AMEND GEN_ALL_CORE FT
Patient seen and examined with surgery PA on rounds and discussed management plans with patient and her son by bedside who came yesterday from [place]. Patient lives alone. Sudden onset of pain with vomiting prior to admission, had recent endoscopy with biopsy also by Dr. Ngo.  Initial Ct and followup xrays reviewed now with resolved obstruction and improved symptoms. Patient hungry and + flatus. Repeat Xrays with po contrast in colon. With resolved symptoms and findings no need for diagnostic laparoscopy at this time. Will feed patient now and discharge in 24 hours . Dietary instructions given to patient.

## 2024-06-02 NOTE — DISCHARGE NOTE PROVIDER - NSDCMRMEDTOKEN_GEN_ALL_CORE_FT
ondansetron 4 mg oral tablet, disintegratin tab(s) orally every 8 hours as needed for nausea/vomiting  sucralfate 1 g oral tablet: 1 tab(s) orally 4 times a day (before meals and at bedtime)  Synthroid 112 mcg (0.112 mg) oral tablet: 1 tab(s) orally once a day

## 2024-06-02 NOTE — DISCHARGE NOTE PROVIDER - NSDCFUSCHEDAPPT_GEN_ALL_CORE_FT
Fili Huggins  [facility]  [address] [address] [place] [address] Av  Scheduled Appointment: 07/01/2024

## 2024-06-02 NOTE — DISCHARGE NOTE PROVIDER - CARE PROVIDER_API CALL
Meme Stover  Surgery  [address] [facility], [address]  [place] [address]  Phone: [phone]  Fax: [phone]  Follow Up Time: 1 week    Grazyna Stokes  Internal Medicine  [address] [facility] [address] [facility] [address]  [place] [address]  Phone: [phone]  Fax: [phone]  Follow Up Time: 1 week    Francisco J Ngo  Gastroenterology  [address] [place] [id number]  Phone: [phone]  Fax: [phone]  Follow Up Time:

## 2024-06-02 NOTE — PROGRESS NOTE ADULT - SUBJECTIVE AND OBJECTIVE BOX
GENERAL SURGERY PROGRESS NOTE     68y F states feeling better , min abd pain   + flatus but no bm   Patient hungry  ambulating fine ,   denies cp / sob / nvd / fever    Vital Signs Last 24 Hrs  T(C): 36.6 (02 Jun 2024 06:24), Max: 37.1 (01 Jun 2024 20:05)  T(F): 97.9 (02 Jun 2024 06:24), Max: 98.7 (01 Jun 2024 20:05)  HR: 60 (02 Jun 2024 06:24) (60 - 68)  BP: 103/64 (02 Jun 2024 06:24) (103/64 - 146/79)  BP(mean): --  RR: 18 (02 Jun 2024 06:24) (18 - 19)  SpO2: 95% (02 Jun 2024 06:24) (95% - 100%)    Parameters below as of 02 Jun 2024 06:24  Patient On (Oxygen Delivery Method): room air      PAST MEDICAL & SURGICAL HISTORY:  Hypothyroidism  Vertigo  History of appendectomy      Allergies  No Known Allergies  Intolerances        DIET/FLUIDS: lactated ringers. 1000 milliLiter(s) IV Continuous <Continuous>      GI proph:  pantoprazole  Injectable 40 milliGRAM(s) IV Push daily    AC/ proph: enoxaparin Injectable 40 milliGRAM(s) SubCutaneous every 24 hours      PHYSICAL EXAM:  GENERAL: NAD, well-appearing  CHEST/LUNG: Clear to auscultation bilaterally  HEART: Regular rate and rhythm  ABDOMEN: Soft, Nontender, Nondistended;   EXTREMITIES:  No clubbing, cyanosis, or edema      06-02    141  |  102  |  10  ----------------------------<  64<L>  3.9   |  24  |  0.7    Ca    8.9      02 Jun 2024 07:37  Phos  3.3     06-01  Mg     1.8     06-02    TPro  5.8<L>  /  Alb  3.7  /  TBili  0.6  /  DBili  x   /  AST  20  /  ALT  9   /  AlkPhos  93  06-02                            12.8   4.20  )-----------( 174      ( 02 Jun 2024 07:37 )             37.8       Urinalysis Basic - ( 01 Jun 2024 01:03 )    Color: x / Appearance: x / SG: x / pH: x  Gluc: 105 mg/dL / Ketone: x  / Bili: x / Urobili: x   Blood: x / Protein: x / Nitrite: x   Leuk Esterase: x / RBC: x / WBC x   Sq Epi: x / Non Sq Epi: x / Bacteria: x    < from: Xray Abdomen Minimum 3 Views (06.01.24 @ 11:58) >  Findings/  impression:    Contrast is seen within the colon. There is a decrease in dilated loops   of small bowel which may reflect improving small bowel obstruction or   partial small bowel obstruction. Continued follow-up with abdominal plain   film radiographs.    Degenerative changes.    < end of copied text >     Implemented All Universal Safety Interventions:  [place] to call system. Call bell, personal items and telephone within reach. Instruct patient to call for assistance. Room bathroom lighting operational. Non-slip footwear when patient is off stretcher. Physically safe environment: no spills, clutter or unnecessary equipment. Stretcher in lowest position, wheels locked, appropriate side rails in place.

## 2024-06-02 NOTE — DISCHARGE NOTE NURSING/CASE MANAGEMENT/SOCIAL WORK - PATIENT PORTAL LINK FT
You can access the FollowMyHealth Patient Portal offered by [facility] by registering at the following website: [email or web address]. By joining [employer]’s FollowMyHealth portal, you will also be able to view your health information using other applications (apps) compatible with our system.

## 2024-06-02 NOTE — DISCHARGE NOTE PROVIDER - NSDCCPCAREPLAN_GEN_ALL_CORE_FT
PRINCIPAL DISCHARGE DIAGNOSIS  Diagnosis: SBO (small bowel obstruction)  Assessment and Plan of Treatment: you were treated medically and no surgery needed as your intestines resumed function.  keep stools soft and go daily   hydrate well   follow up with GI / pcp

## 2024-06-02 NOTE — DISCHARGE NOTE PROVIDER - HOSPITAL COURSE
69yo F with a pmhx of hypothyroidism, GERD, appendectomy x years ago, several days described as located in midabdomen, constant, moderate to severe, and relieved after pain meds and IVF in ED. Pt reports associated nausea and vomiting multiple times unable to tolerate any po intake liquid or food. Last BM was yesterday and she noticed some blood at that time but unsure where it came from. Pt reports having endoscopy by DR Ngo two days ago but her sx present before the procedure. Pt denies associated fevers, chills.    < from: CT Abdomen and Pelvis w/ IV Cont (06.01.24 @ 02:03) >    IMPRESSION:    Small bowel obstruction secondary to distal small bowel-small bowel   intussusception within pelvis.    Distal small bowel pathology unlikely related to coincidental recent   upper endoscopy.    kept npo - serial abd exams   subsequent rad imaging reveals movement of contrast in colon   with flatus+  started on clrs and advanced to reg diet     f/u with  gi / pcp and surgeon

## 2024-06-02 NOTE — PROGRESS NOTE ADULT - ASSESSMENT
69yo F with a pmhx of hypothyroidism, GERD, appendectomy x years ago, everal days described as located in midabdomen, constant, moderate to severe, and relieved after pain meds and IVF in ED. Pt reports associated nausea and vomiting multiple times unable to tolerate any po intake liquid or food. Last BM was yesterday and she noticed some blood at that time but unsure where it came from. Pt reports having endoscopy by DR Ngo two days ago but her sx present before the procedure. Pt denies associated fevers, chills.    #SBO secondary to SB intussusception, patient now asymptomatic, hungry and asking to eat, passing gas    - NO SURGICAL INTERVENTION   - will start diet   - IVF  -serial exam and lab  -pain meds prn  -dvt and GI prophylaxis      d/w dr magallanes

## 2024-06-07 DIAGNOSIS — Z90.49 ACQUIRED ABSENCE OF OTHER SPECIFIED PARTS OF DIGESTIVE TRACT: ICD-10-CM

## 2024-06-07 DIAGNOSIS — K21.9 GASTRO-ESOPHAGEAL REFLUX DISEASE WITHOUT ESOPHAGITIS: ICD-10-CM

## 2024-06-07 DIAGNOSIS — K56.600 PARTIAL INTESTINAL OBSTRUCTION, UNSPECIFIED AS TO CAUSE: ICD-10-CM

## 2024-06-07 DIAGNOSIS — E03.9 HYPOTHYROIDISM, UNSPECIFIED: ICD-10-CM

## 2024-06-07 DIAGNOSIS — K56.1 INTUSSUSCEPTION: ICD-10-CM

## 2024-07-01 ENCOUNTER — APPOINTMENT (OUTPATIENT)
Dept: PULMONOLOGY | Facility: CLINIC | Age: 69
End: 2024-07-01
Payer: MEDICARE

## 2024-07-01 VITALS
BODY MASS INDEX: 21.5 KG/M2 | OXYGEN SATURATION: 98 % | HEART RATE: 73 BPM | SYSTOLIC BLOOD PRESSURE: 130 MMHG | DIASTOLIC BLOOD PRESSURE: 80 MMHG | WEIGHT: 137 LBS | HEIGHT: 67 IN

## 2024-07-01 DIAGNOSIS — R91.1 SOLITARY PULMONARY NODULE: ICD-10-CM

## 2024-07-01 PROCEDURE — 99212 OFFICE O/P EST SF 10 MIN: CPT

## 2024-07-06 ENCOUNTER — INPATIENT (INPATIENT)
Facility: HOSPITAL | Age: 69
LOS: 3 days | Discharge: HOME | DRG: 390 | End: 2024-07-10
Attending: SURGERY | Admitting: SURGERY
Payer: MEDICARE

## 2024-07-06 VITALS
RESPIRATION RATE: 18 BRPM | TEMPERATURE: 98 F | HEART RATE: 71 BPM | HEIGHT: 67 IN | WEIGHT: 130.07 LBS | SYSTOLIC BLOOD PRESSURE: 127 MMHG | DIASTOLIC BLOOD PRESSURE: 84 MMHG | OXYGEN SATURATION: 97 %

## 2024-07-06 DIAGNOSIS — Z90.49 ACQUIRED ABSENCE OF OTHER SPECIFIED PARTS OF DIGESTIVE TRACT: Chronic | ICD-10-CM

## 2024-07-06 DIAGNOSIS — K56.1 INTUSSUSCEPTION: ICD-10-CM

## 2024-07-06 LAB
ALBUMIN SERPL ELPH-MCNC: 4.8 G/DL — SIGNIFICANT CHANGE UP (ref 3.5–5.2)
ALP SERPL-CCNC: 110 U/L — SIGNIFICANT CHANGE UP (ref 30–115)
ALT FLD-CCNC: 10 U/L — SIGNIFICANT CHANGE UP (ref 0–41)
ANION GAP SERPL CALC-SCNC: 16 MMOL/L — HIGH (ref 7–14)
APPEARANCE UR: CLEAR — SIGNIFICANT CHANGE UP
APTT BLD: 26.5 SEC — LOW (ref 27–39.2)
AST SERPL-CCNC: 19 U/L — SIGNIFICANT CHANGE UP (ref 0–41)
BACTERIA # UR AUTO: NEGATIVE /HPF — SIGNIFICANT CHANGE UP
BASOPHILS # BLD AUTO: 0.03 K/UL — SIGNIFICANT CHANGE UP (ref 0–0.2)
BASOPHILS NFR BLD AUTO: 0.5 % — SIGNIFICANT CHANGE UP (ref 0–1)
BILIRUB SERPL-MCNC: 0.9 MG/DL — SIGNIFICANT CHANGE UP (ref 0.2–1.2)
BILIRUB UR-MCNC: NEGATIVE — SIGNIFICANT CHANGE UP
BLD GP AB SCN SERPL QL: SIGNIFICANT CHANGE UP
BUN SERPL-MCNC: 11 MG/DL — SIGNIFICANT CHANGE UP (ref 10–20)
CALCIUM SERPL-MCNC: 9.8 MG/DL — SIGNIFICANT CHANGE UP (ref 8.4–10.5)
CAST: 2 /LPF — SIGNIFICANT CHANGE UP (ref 0–4)
CHLORIDE SERPL-SCNC: 97 MMOL/L — LOW (ref 98–110)
CO2 SERPL-SCNC: 24 MMOL/L — SIGNIFICANT CHANGE UP (ref 17–32)
COLOR SPEC: YELLOW — SIGNIFICANT CHANGE UP
CREAT SERPL-MCNC: 0.8 MG/DL — SIGNIFICANT CHANGE UP (ref 0.7–1.5)
DIFF PNL FLD: ABNORMAL
EGFR: 80 ML/MIN/1.73M2 — SIGNIFICANT CHANGE UP
EOSINOPHIL # BLD AUTO: 0.01 K/UL — SIGNIFICANT CHANGE UP (ref 0–0.7)
EOSINOPHIL NFR BLD AUTO: 0.2 % — SIGNIFICANT CHANGE UP (ref 0–8)
GLUCOSE SERPL-MCNC: 103 MG/DL — HIGH (ref 70–99)
GLUCOSE UR QL: NEGATIVE MG/DL — SIGNIFICANT CHANGE UP
HCT VFR BLD CALC: 42.2 % — SIGNIFICANT CHANGE UP (ref 37–47)
HGB BLD-MCNC: 14.6 G/DL — SIGNIFICANT CHANGE UP (ref 12–16)
IMM GRANULOCYTES NFR BLD AUTO: 0.3 % — SIGNIFICANT CHANGE UP (ref 0.1–0.3)
INR BLD: 1.05 RATIO — SIGNIFICANT CHANGE UP (ref 0.65–1.3)
KETONES UR-MCNC: 80 MG/DL
LACTATE SERPL-SCNC: 1.8 MMOL/L — SIGNIFICANT CHANGE UP (ref 0.7–2)
LEUKOCYTE ESTERASE UR-ACNC: NEGATIVE — SIGNIFICANT CHANGE UP
LIDOCAIN IGE QN: 23 U/L — SIGNIFICANT CHANGE UP (ref 7–60)
LYMPHOCYTES # BLD AUTO: 1.41 K/UL — SIGNIFICANT CHANGE UP (ref 1.2–3.4)
LYMPHOCYTES # BLD AUTO: 23.5 % — SIGNIFICANT CHANGE UP (ref 20.5–51.1)
MAGNESIUM SERPL-MCNC: 1.9 MG/DL — SIGNIFICANT CHANGE UP (ref 1.8–2.4)
MCHC RBC-ENTMCNC: 29.5 PG — SIGNIFICANT CHANGE UP (ref 27–31)
MCHC RBC-ENTMCNC: 34.6 G/DL — SIGNIFICANT CHANGE UP (ref 32–37)
MCV RBC AUTO: 85.3 FL — SIGNIFICANT CHANGE UP (ref 81–99)
MONOCYTES # BLD AUTO: 0.44 K/UL — SIGNIFICANT CHANGE UP (ref 0.1–0.6)
MONOCYTES NFR BLD AUTO: 7.3 % — SIGNIFICANT CHANGE UP (ref 1.7–9.3)
NEUTROPHILS # BLD AUTO: 4.09 K/UL — SIGNIFICANT CHANGE UP (ref 1.4–6.5)
NEUTROPHILS NFR BLD AUTO: 68.2 % — SIGNIFICANT CHANGE UP (ref 42.2–75.2)
NITRITE UR-MCNC: NEGATIVE — SIGNIFICANT CHANGE UP
NRBC # BLD: 0 /100 WBCS — SIGNIFICANT CHANGE UP (ref 0–0)
PH UR: 6.5 — SIGNIFICANT CHANGE UP (ref 5–8)
PLATELET # BLD AUTO: 215 K/UL — SIGNIFICANT CHANGE UP (ref 130–400)
PMV BLD: 12 FL — HIGH (ref 7.4–10.4)
POTASSIUM SERPL-MCNC: 4 MMOL/L — SIGNIFICANT CHANGE UP (ref 3.5–5)
POTASSIUM SERPL-SCNC: 4 MMOL/L — SIGNIFICANT CHANGE UP (ref 3.5–5)
PROT SERPL-MCNC: 7.1 G/DL — SIGNIFICANT CHANGE UP (ref 6–8)
PROT UR-MCNC: NEGATIVE MG/DL — SIGNIFICANT CHANGE UP
PROTHROM AB SERPL-ACNC: 12 SEC — SIGNIFICANT CHANGE UP (ref 9.95–12.87)
RBC # BLD: 4.95 M/UL — SIGNIFICANT CHANGE UP (ref 4.2–5.4)
RBC # FLD: 12.6 % — SIGNIFICANT CHANGE UP (ref 11.5–14.5)
RBC CASTS # UR COMP ASSIST: 3 /HPF — SIGNIFICANT CHANGE UP (ref 0–4)
SODIUM SERPL-SCNC: 137 MMOL/L — SIGNIFICANT CHANGE UP (ref 135–146)
SP GR SPEC: >1.03 — HIGH (ref 1–1.03)
SQUAMOUS # UR AUTO: 0 /HPF — SIGNIFICANT CHANGE UP (ref 0–5)
UROBILINOGEN FLD QL: 0.2 MG/DL — SIGNIFICANT CHANGE UP (ref 0.2–1)
WBC # BLD: 6 K/UL — SIGNIFICANT CHANGE UP (ref 4.8–10.8)
WBC # FLD AUTO: 6 K/UL — SIGNIFICANT CHANGE UP (ref 4.8–10.8)
WBC UR QL: 1 /HPF — SIGNIFICANT CHANGE UP (ref 0–5)

## 2024-07-06 PROCEDURE — 86850 RBC ANTIBODY SCREEN: CPT

## 2024-07-06 PROCEDURE — 85610 PROTHROMBIN TIME: CPT

## 2024-07-06 PROCEDURE — 85025 COMPLETE CBC W/AUTO DIFF WBC: CPT

## 2024-07-06 PROCEDURE — 84100 ASSAY OF PHOSPHORUS: CPT

## 2024-07-06 PROCEDURE — 80048 BASIC METABOLIC PNL TOTAL CA: CPT

## 2024-07-06 PROCEDURE — 99285 EMERGENCY DEPT VISIT HI MDM: CPT | Mod: FS

## 2024-07-06 PROCEDURE — 71045 X-RAY EXAM CHEST 1 VIEW: CPT | Mod: 26

## 2024-07-06 PROCEDURE — 88341 IMHCHEM/IMCYTCHM EA ADD ANTB: CPT

## 2024-07-06 PROCEDURE — 86901 BLOOD TYPING SEROLOGIC RH(D): CPT

## 2024-07-06 PROCEDURE — 88309 TISSUE EXAM BY PATHOLOGIST: CPT

## 2024-07-06 PROCEDURE — 88342 IMHCHEM/IMCYTCHM 1ST ANTB: CPT

## 2024-07-06 PROCEDURE — C9399: CPT

## 2024-07-06 PROCEDURE — 83735 ASSAY OF MAGNESIUM: CPT

## 2024-07-06 PROCEDURE — 85730 THROMBOPLASTIN TIME PARTIAL: CPT

## 2024-07-06 PROCEDURE — 93971 EXTREMITY STUDY: CPT | Mod: LT

## 2024-07-06 PROCEDURE — 93010 ELECTROCARDIOGRAM REPORT: CPT

## 2024-07-06 PROCEDURE — 99222 1ST HOSP IP/OBS MODERATE 55: CPT

## 2024-07-06 PROCEDURE — 86900 BLOOD TYPING SEROLOGIC ABO: CPT

## 2024-07-06 PROCEDURE — 36415 COLL VENOUS BLD VENIPUNCTURE: CPT

## 2024-07-06 PROCEDURE — 85027 COMPLETE CBC AUTOMATED: CPT

## 2024-07-06 PROCEDURE — 74178 CT ABD&PLV WO CNTR FLWD CNTR: CPT | Mod: 26,MC

## 2024-07-06 PROCEDURE — C1889: CPT

## 2024-07-06 RX ORDER — MORPHINE SULFATE 100 MG/1
4 TABLET, EXTENDED RELEASE ORAL ONCE
Refills: 0 | Status: DISCONTINUED | OUTPATIENT
Start: 2024-07-06 | End: 2024-07-06

## 2024-07-06 RX ORDER — PANTOPRAZOLE SODIUM 40 MG/10ML
40 INJECTION, POWDER, FOR SOLUTION INTRAVENOUS DAILY
Refills: 0 | Status: DISCONTINUED | OUTPATIENT
Start: 2024-07-06 | End: 2024-07-08

## 2024-07-06 RX ORDER — IOHEXOL 350 MG/ML
30 INJECTION, SOLUTION INTRAVENOUS ONCE
Refills: 0 | Status: COMPLETED | OUTPATIENT
Start: 2024-07-06 | End: 2024-07-06

## 2024-07-06 RX ORDER — ONDANSETRON HYDROCHLORIDE 2 MG/ML
4 INJECTION INTRAMUSCULAR; INTRAVENOUS EVERY 6 HOURS
Refills: 0 | Status: DISCONTINUED | OUTPATIENT
Start: 2024-07-06 | End: 2024-07-10

## 2024-07-06 RX ORDER — DEXTROSE MONOHYDRATE AND SODIUM CHLORIDE 5; .3 G/100ML; G/100ML
1000 INJECTION, SOLUTION INTRAVENOUS ONCE
Refills: 0 | Status: COMPLETED | OUTPATIENT
Start: 2024-07-06 | End: 2024-07-06

## 2024-07-06 RX ORDER — TRAMADOL HYDROCHLORIDE 50 MG/1
50 TABLET, FILM COATED ORAL EVERY 4 HOURS
Refills: 0 | Status: DISCONTINUED | OUTPATIENT
Start: 2024-07-06 | End: 2024-07-06

## 2024-07-06 RX ORDER — DEXTROSE MONOHYDRATE AND SODIUM CHLORIDE 5; .3 G/100ML; G/100ML
1000 INJECTION, SOLUTION INTRAVENOUS
Refills: 0 | Status: DISCONTINUED | OUTPATIENT
Start: 2024-07-06 | End: 2024-07-09

## 2024-07-06 RX ORDER — ONDANSETRON HYDROCHLORIDE 2 MG/ML
4 INJECTION INTRAMUSCULAR; INTRAVENOUS ONCE
Refills: 0 | Status: COMPLETED | OUTPATIENT
Start: 2024-07-06 | End: 2024-07-06

## 2024-07-06 RX ORDER — ACETAMINOPHEN 325 MG
650 TABLET ORAL EVERY 6 HOURS
Refills: 0 | Status: DISCONTINUED | OUTPATIENT
Start: 2024-07-06 | End: 2024-07-10

## 2024-07-06 RX ORDER — SODIUM CHLORIDE 0.9 % (FLUSH) 0.9 %
1000 SYRINGE (ML) INJECTION ONCE
Refills: 0 | Status: COMPLETED | OUTPATIENT
Start: 2024-07-06 | End: 2024-07-06

## 2024-07-06 RX ORDER — HYDROMORPHONE HCL 0.2 MG/ML
0.5 INJECTION, SOLUTION INTRAVENOUS ONCE
Refills: 0 | Status: DISCONTINUED | OUTPATIENT
Start: 2024-07-06 | End: 2024-07-06

## 2024-07-06 RX ORDER — ENOXAPARIN SODIUM 100 MG/ML
40 INJECTION SUBCUTANEOUS EVERY 24 HOURS
Refills: 0 | Status: DISCONTINUED | OUTPATIENT
Start: 2024-07-06 | End: 2024-07-10

## 2024-07-06 RX ADMIN — Medication 1000 MILLILITER(S): at 15:00

## 2024-07-06 RX ADMIN — IOHEXOL 30 MILLILITER(S): 350 INJECTION, SOLUTION INTRAVENOUS at 14:00

## 2024-07-06 RX ADMIN — MORPHINE SULFATE 4 MILLIGRAM(S): 100 TABLET, EXTENDED RELEASE ORAL at 14:00

## 2024-07-06 RX ADMIN — MORPHINE SULFATE 4 MILLIGRAM(S): 100 TABLET, EXTENDED RELEASE ORAL at 14:30

## 2024-07-06 RX ADMIN — ONDANSETRON HYDROCHLORIDE 4 MILLIGRAM(S): 2 INJECTION INTRAMUSCULAR; INTRAVENOUS at 15:25

## 2024-07-06 RX ADMIN — HYDROMORPHONE HCL 0.5 MILLIGRAM(S): 0.2 INJECTION, SOLUTION INTRAVENOUS at 17:30

## 2024-07-06 RX ADMIN — Medication 1000 MILLILITER(S): at 14:54

## 2024-07-06 RX ADMIN — HYDROMORPHONE HCL 0.5 MILLIGRAM(S): 0.2 INJECTION, SOLUTION INTRAVENOUS at 16:52

## 2024-07-06 RX ADMIN — DEXTROSE MONOHYDRATE AND SODIUM CHLORIDE 1000 MILLILITER(S): 5; .3 INJECTION, SOLUTION INTRAVENOUS at 16:28

## 2024-07-06 NOTE — ED PROVIDER NOTE - ATTENDING APP SHARED VISIT CONTRIBUTION OF CARE
68-year-old female with past medical history of vertigo, hypothyroidism, recently diagnosed with SBO secondary to intussusception last month, presents with complaint of abdominal pain, unable to tolerate p.o. for the last few weeks, weight loss.  Patient had incidentally had an EGD with Dr. Ngo prior to her [facility] ED visit when she was diagnosed with intussusception.  Patient was offered surgery, however before proceeding they repeated an x-ray with oral contrast in 6 showed that the intussusception was resolving.  Patient was DC, patient was feeling mildly better, and was supposed to follow-up with outpatient MRI of the abdomen which she has scheduled on July 15.  Patient says since yesterday started having increasing pain again, similar to her previous SBO.  Patient has been unable to work tolerate p.o. however having bowel movements, sometimes diarrhea.  Patient has not passed gas.  Patient has no fever or chills.  Patient feels weak.  Exam: nad, ncat, perrl, eomi, dry mm, rrr, ctab, abd soft, diffuse tenderness, mild, nd aox3, no calf tenderness, no pitting edema impression: Patient with recent SBO's secondary to intussusception, now with abdominal pain and vomiting, will check labs, IV fluids, symptomatic treatment, oral contrast and CT

## 2024-07-06 NOTE — ED ADULT TRIAGE NOTE - CHIEF COMPLAINT QUOTE
Pt c/o dehydration, unable to tolerate PO intake x3 weeks ago after getting Endoscopy. Pt also recently dx'd w/ Intussusception. Pt recently seen in ED for similar symptoms.

## 2024-07-06 NOTE — H&P ADULT - ASSESSMENT
ASSESSMENT:   Pt is a 68y female with PMH of Hashimoto, hiatal hernia, GERD,  PSHx include appendectomy and  presents with abdominal spasms and pain for the past few days that was previously intermittent but  has become more persistent today. Pt also endorses multiple episodes of vomiting that began yesterday of gray color that has become clear fluid today since she has not eaten due to pain with PO intake. Repeat CT A/P performed today in the ED showed Long distal ileal cecal intussusception with mild distention of more proximal small bowel is noted. No ascites or pneumoperitoneum. Physical exam significant for tenderness in RLQ.     PLAN:  - admit to surgical floor 4C   - consented and add on for diagnostic laparoscopy possible laparotomy possible bowel resection  - NPO, IVF  - obtain preop labsT&S, coags, CBC, BMP  - hemodynamic monitoring  - strict I&Os  -DVT/GI PPX  - multimodal pain regimen  - antiemetics prn         Above plan discussed with Attending Surgeon Dr. Fishman, patient, patient family, and ED team

## 2024-07-06 NOTE — H&P ADULT - NSHPPHYSICALEXAM_GEN_ALL_CORE
VITALS:  T(F): 98.3 (07-06-24 @ 12:37), Max: 98.3 (07-06-24 @ 12:37)  HR: 50 (07-06-24 @ 15:30) (50 - 71)  BP: 133/71 (07-06-24 @ 15:30) (127/84 - 133/71)  RR: 18 (07-06-24 @ 15:30) (18 - 18)  SpO2: 99% (07-06-24 @ 15:30) (97% - 99%)    PHYSICAL EXAM:  General: NAD, AAOx3, calm and cooperative  Cardiac: RRR S1, S2  Respiratory: CTAB, normal respiratory effort, no acute respiratory distress, saturating well on room air.  Abdomen: Soft, non-distended, no rebound, no guarding. + tenderness in RLQ  Musculoskeletal: Strength 5/5 BL UE/LE, ROM intact, compartments soft  Vascular: Pulses 2+ throughout, extremities well perfused  Skin: Warm/dry, normal color

## 2024-07-06 NOTE — H&P ADULT - HISTORY OF PRESENT ILLNESS
Pt is a 68y female with PMH of Hashimoto, hiatal hernia, GERD,  PSHx include appendectomy and  presents with abdominal spasms and pain for the past few days that was previously intermittent but  has become more persistent today. Pt also endorses multiple episodes of vomiting that began yesterday of gray color that has become clear fluid today since she has not eaten due to pain with PO intake. Pt was evaluated by the surgery team at the [facility] site on  with the same chief complaint of abdominal pain. CT A/P at that time showed Multiple loops of dilated small bowel, up to 3.7 cm with transition point tocollapsed distal small bowel within pelvis at level of small bowel-small bowel intussusception. Circumferential wall thickening of intussuscepted small bowel loop. Mild interloop ascites. No pneumoperitoneum. Pt was admitted for observation under surgical service at [facility], abdominal pain resolved overnight with flatus and increased appetite. Decision was made by pt to defer surgical intervention and follow up outpatient. However, the abdominal pain returned the past few days that is crampy in nature localized in the RLQ. Pt saw her gastroenterologist and was prescribed dicyclomine which did not improve her pain. Last flatus and BM was yesterday, BM noted to be bloody. Repeat CT A/P performed today in the ED showed Long distal ileal cecal intussusception with mild distention of more proximal small bowel is noted. No ascites or pneumoperitoneum.             Pt is a 68y female with PMH of Hashimoto, hiatal hernia, GERD,  PSHx include appendectomy and  presents with abdominal spasms and pain for the past few days that was previously intermittent but  has become more persistent today. Pt also endorses multiple episodes of vomiting that began yesterday of gray color that has become clear fluid today since she has not eaten due to pain with PO intake. Pt was evaluated by the surgery team at the [facility] site on  with the same chief complaint of abdominal pain. CT A/P at that time showed Multiple loops of dilated small bowel, up to 3.7 cm with transition point to collapsed distal small bowel within pelvis at level of small bowel-small bowel intussusception. Circumferential wall thickening of intussuscepted small bowel loop. Mild interloop ascites. No pneumoperitoneum. Pt was admitted for observation under surgical service at [facility], abdominal pain resolved overnight with flatus and increased appetite. Decision was made by pt to defer surgical intervention and follow up outpatient. However, the abdominal pain returned the past few days that is crampy in nature localized in the RLQ. Pt saw her gastroenterologist and was prescribed dicyclomine which did not improve her pain. Last flatus and BM was yesterday, BM noted to be bloody. Repeat CT A/P performed today in the ED showed Long distal ileal cecal intussusception with mild distention of more proximal small bowel is noted. No ascites or pneumoperitoneum.

## 2024-07-06 NOTE — ED PROVIDER NOTE - OBJECTIVE STATEMENT
68-year-old female history of vertigo, hypothyroid, SBO recently diagnosed secondary to intussusception presents ED for evaluation of abdominal pain.  Patient states for last 2 days she has been unable to tolerate p.o. she was recently seen and diagnosed with intussusception but was and was offered surgery.  Patient states that they repeated a abdominal x-ray which showed it was resolving and patient was DC.  Patient states she mildly improved but since has become worse.  Patient supposed to get abdominal MRI on July 15.  Patient denies CP, SOB, fever, chills, diarrhea, dysuria

## 2024-07-06 NOTE — H&P ADULT - ATTENDING COMMENTS
Patient seen and examined with surgery team in ED and discussed management plans with patient and family by bedside. Patient known to me from prior hospitalization at [facility] and resolved. Patient was followed by Gi and was pending MRI for diagnosis but this week has had multiple episodes of crampy abd pain and vomiting. Repeat Ct scan today showed similar picture of intussusception in distal small bowel in RLQ.   Abd exam old well healed transverse appendectomy scar. Suggestion of fullness RLQ.  Lab and images reviewed. Options discussed with patient. Informed consent signed and patient to go to OR tonight.

## 2024-07-06 NOTE — ED PROVIDER NOTE - PHYSICAL EXAMINATION
VITAL SIGNS: I have reviewed nursing notes and confirm.  CONSTITUTIONAL: in no acute distress.  SKIN: Skin exam is warm and dry, no acute rash.  HEAD: Normocephalic; atraumatic.  EYES:  EOM intact; conjunctiva and sclera clear.  ENT: No nasal discharge; airway clear.   NECK: Supple; non tender.  CARD: S1, S2 normal; no murmurs, gallops, or rubs. Regular rate and rhythm.  RESP: No wheezes, rales or rhonchi. Speaking in full sentences.   ABD:  soft; non-distended; diffuse tenderness; No rebound or guarding. No CVA tenderness.  EXT: Normal ROM. No clubbing, cyanosis or edema.

## 2024-07-06 NOTE — H&P ADULT - NSHPLABSRESULTS_GEN_ALL_CORE
LAB/STUDIES:                        14.6   6.00  )-----------( 215      ( 06 Jul 2024 14:10 )             42.2     07-06    137  |  97<L>  |  11  ----------------------------<  103<H>  4.0   |  24  |  0.8    Ca    9.8      06 Jul 2024 14:10  Mg     1.9     07-06    TPro  7.1  /  Alb  4.8  /  TBili  0.9  /  DBili  x   /  AST  19  /  ALT  10  /  AlkPhos  110  07-06      LIVER FUNCTIONS - ( 06 Jul 2024 14:10 )  Alb: 4.8 g/dL / Pro: 7.1 g/dL / ALK PHOS: 110 U/L / ALT: 10 U/L / AST: 19 U/L / GGT: x           Urinalysis Basic - ( 06 Jul 2024 19:00 )    Color: Yellow / Appearance: Clear / SG: >1.030 / pH: x  Gluc: x / Ketone: 80 mg/dL  / Bili: Negative / Urobili: 0.2 mg/dL   Blood: x / Protein: Negative mg/dL / Nitrite: Negative   Leuk Esterase: Negative / RBC: 3 /HPF / WBC 1 /HPF   Sq Epi: x / Non Sq Epi: 0 /HPF / Bacteria: Negative /HPF        IMAGING:      < from: CT Abdomen and Pelvis w/ Oral Cont and w/wo IV Cont (07.06.24 @ 18:26) >    IMPRESSION:    Long distal ileal cecal intussusception with mild distention of more   proximal small bowel is noted.    No ascites or pneumoperitoneum    < end of copied text >

## 2024-07-06 NOTE — ED PROVIDER NOTE - CLINICAL SUMMARY MEDICAL DECISION MAKING FREE TEXT BOX
Pt with recent sbo and intussussception that had resolved on its own, here now with abd pain and n/v, found again to have intussussception.  surgery consulted and admitted to their service.  Any ordered labs and EKG were reviewed, Dr. Samantha Thomas, attending physician.  Any imaging was ordered and reviewed by me, Dr. Samantha Thomas, attending physician.  Appropriate medications for patient's presenting complaints were ordered and effects were reassessed.  Patient's records, if available,  (prior hospital, ED visit, and/or nursing home notes if available) were reviewed.  Additional history was obtained from EMS, family, and/or PCP (when available).  Escalation to admission/observation was considered. \Patient requires inpatient hospitalization - monitored setting.

## 2024-07-07 LAB
ANION GAP SERPL CALC-SCNC: 13 MMOL/L — SIGNIFICANT CHANGE UP (ref 7–14)
ANION GAP SERPL CALC-SCNC: 15 MMOL/L — HIGH (ref 7–14)
BUN SERPL-MCNC: 8 MG/DL — LOW (ref 10–20)
BUN SERPL-MCNC: 9 MG/DL — LOW (ref 10–20)
CALCIUM SERPL-MCNC: 8.8 MG/DL — SIGNIFICANT CHANGE UP (ref 8.4–10.5)
CALCIUM SERPL-MCNC: 9.2 MG/DL — SIGNIFICANT CHANGE UP (ref 8.4–10.5)
CHLORIDE SERPL-SCNC: 101 MMOL/L — SIGNIFICANT CHANGE UP (ref 98–110)
CHLORIDE SERPL-SCNC: 98 MMOL/L — SIGNIFICANT CHANGE UP (ref 98–110)
CO2 SERPL-SCNC: 20 MMOL/L — SIGNIFICANT CHANGE UP (ref 17–32)
CO2 SERPL-SCNC: 24 MMOL/L — SIGNIFICANT CHANGE UP (ref 17–32)
CREAT SERPL-MCNC: 0.8 MG/DL — SIGNIFICANT CHANGE UP (ref 0.7–1.5)
CREAT SERPL-MCNC: 0.8 MG/DL — SIGNIFICANT CHANGE UP (ref 0.7–1.5)
EGFR: 80 ML/MIN/1.73M2 — SIGNIFICANT CHANGE UP
EGFR: 80 ML/MIN/1.73M2 — SIGNIFICANT CHANGE UP
GLUCOSE SERPL-MCNC: 105 MG/DL — HIGH (ref 70–99)
GLUCOSE SERPL-MCNC: 90 MG/DL — SIGNIFICANT CHANGE UP (ref 70–99)
HCT VFR BLD CALC: 38.3 % — SIGNIFICANT CHANGE UP (ref 37–47)
HCT VFR BLD CALC: 38.6 % — SIGNIFICANT CHANGE UP (ref 37–47)
HGB BLD-MCNC: 13 G/DL — SIGNIFICANT CHANGE UP (ref 12–16)
HGB BLD-MCNC: 13.1 G/DL — SIGNIFICANT CHANGE UP (ref 12–16)
MAGNESIUM SERPL-MCNC: 1.7 MG/DL — LOW (ref 1.8–2.4)
MAGNESIUM SERPL-MCNC: 1.8 MG/DL — SIGNIFICANT CHANGE UP (ref 1.8–2.4)
MCHC RBC-ENTMCNC: 29.3 PG — SIGNIFICANT CHANGE UP (ref 27–31)
MCHC RBC-ENTMCNC: 29.5 PG — SIGNIFICANT CHANGE UP (ref 27–31)
MCHC RBC-ENTMCNC: 33.7 G/DL — SIGNIFICANT CHANGE UP (ref 32–37)
MCHC RBC-ENTMCNC: 34.2 G/DL — SIGNIFICANT CHANGE UP (ref 32–37)
MCV RBC AUTO: 86.3 FL — SIGNIFICANT CHANGE UP (ref 81–99)
MCV RBC AUTO: 87.1 FL — SIGNIFICANT CHANGE UP (ref 81–99)
NRBC # BLD: 0 /100 WBCS — SIGNIFICANT CHANGE UP (ref 0–0)
NRBC # BLD: 0 /100 WBCS — SIGNIFICANT CHANGE UP (ref 0–0)
PHOSPHATE SERPL-MCNC: 2.9 MG/DL — SIGNIFICANT CHANGE UP (ref 2.1–4.9)
PHOSPHATE SERPL-MCNC: 3.7 MG/DL — SIGNIFICANT CHANGE UP (ref 2.1–4.9)
PLATELET # BLD AUTO: 184 K/UL — SIGNIFICANT CHANGE UP (ref 130–400)
PLATELET # BLD AUTO: 236 K/UL — SIGNIFICANT CHANGE UP (ref 130–400)
PMV BLD: 12.6 FL — HIGH (ref 7.4–10.4)
PMV BLD: 12.6 FL — HIGH (ref 7.4–10.4)
POTASSIUM SERPL-MCNC: 3.8 MMOL/L — SIGNIFICANT CHANGE UP (ref 3.5–5)
POTASSIUM SERPL-MCNC: 4 MMOL/L — SIGNIFICANT CHANGE UP (ref 3.5–5)
POTASSIUM SERPL-SCNC: 3.8 MMOL/L — SIGNIFICANT CHANGE UP (ref 3.5–5)
POTASSIUM SERPL-SCNC: 4 MMOL/L — SIGNIFICANT CHANGE UP (ref 3.5–5)
RBC # BLD: 4.43 M/UL — SIGNIFICANT CHANGE UP (ref 4.2–5.4)
RBC # BLD: 4.44 M/UL — SIGNIFICANT CHANGE UP (ref 4.2–5.4)
RBC # FLD: 12.7 % — SIGNIFICANT CHANGE UP (ref 11.5–14.5)
RBC # FLD: 12.9 % — SIGNIFICANT CHANGE UP (ref 11.5–14.5)
SODIUM SERPL-SCNC: 131 MMOL/L — LOW (ref 135–146)
SODIUM SERPL-SCNC: 140 MMOL/L — SIGNIFICANT CHANGE UP (ref 135–146)
WBC # BLD: 10.69 K/UL — SIGNIFICANT CHANGE UP (ref 4.8–10.8)
WBC # BLD: 12.18 K/UL — HIGH (ref 4.8–10.8)
WBC # FLD AUTO: 10.69 K/UL — SIGNIFICANT CHANGE UP (ref 4.8–10.8)
WBC # FLD AUTO: 12.18 K/UL — HIGH (ref 4.8–10.8)

## 2024-07-07 PROCEDURE — 88341 IMHCHEM/IMCYTCHM EA ADD ANTB: CPT | Mod: 26

## 2024-07-07 PROCEDURE — 88342 IMHCHEM/IMCYTCHM 1ST ANTB: CPT | Mod: 26

## 2024-07-07 PROCEDURE — 88309 TISSUE EXAM BY PATHOLOGIST: CPT | Mod: 26

## 2024-07-07 PROCEDURE — 99232 SBSQ HOSP IP/OBS MODERATE 35: CPT | Mod: 24,25

## 2024-07-07 PROCEDURE — 49320 DIAG LAPARO SEPARATE PROC: CPT

## 2024-07-07 RX ORDER — OXYCODONE HYDROCHLORIDE 100 MG/5ML
5 SOLUTION ORAL EVERY 6 HOURS
Refills: 0 | Status: DISCONTINUED | OUTPATIENT
Start: 2024-07-07 | End: 2024-07-07

## 2024-07-07 RX ORDER — LEVOTHYROXINE SODIUM 25 MCG
112 TABLET ORAL DAILY
Refills: 0 | Status: DISCONTINUED | OUTPATIENT
Start: 2024-07-07 | End: 2024-07-10

## 2024-07-07 RX ORDER — HYDROMORPHONE HCL 0.2 MG/ML
0.5 INJECTION, SOLUTION INTRAVENOUS
Refills: 0 | Status: DISCONTINUED | OUTPATIENT
Start: 2024-07-07 | End: 2024-07-07

## 2024-07-07 RX ORDER — DEXTROSE MONOHYDRATE AND SODIUM CHLORIDE 5; .3 G/100ML; G/100ML
1000 INJECTION, SOLUTION INTRAVENOUS
Refills: 0 | Status: DISCONTINUED | OUTPATIENT
Start: 2024-07-07 | End: 2024-07-07

## 2024-07-07 RX ORDER — ONDANSETRON HYDROCHLORIDE 2 MG/ML
4 INJECTION INTRAMUSCULAR; INTRAVENOUS ONCE
Refills: 0 | Status: DISCONTINUED | OUTPATIENT
Start: 2024-07-07 | End: 2024-07-07

## 2024-07-07 RX ORDER — OXYCODONE HYDROCHLORIDE 100 MG/5ML
5 SOLUTION ORAL EVERY 6 HOURS
Refills: 0 | Status: DISCONTINUED | OUTPATIENT
Start: 2024-07-07 | End: 2024-07-09

## 2024-07-07 RX ADMIN — Medication 650 MILLIGRAM(S): at 05:37

## 2024-07-07 RX ADMIN — DEXTROSE MONOHYDRATE AND SODIUM CHLORIDE 100 MILLILITER(S): 5; .3 INJECTION, SOLUTION INTRAVENOUS at 05:40

## 2024-07-07 RX ADMIN — Medication 650 MILLIGRAM(S): at 05:45

## 2024-07-07 RX ADMIN — DEXTROSE MONOHYDRATE AND SODIUM CHLORIDE 100 MILLILITER(S): 5; .3 INJECTION, SOLUTION INTRAVENOUS at 14:08

## 2024-07-07 RX ADMIN — Medication 650 MILLIGRAM(S): at 23:15

## 2024-07-07 RX ADMIN — Medication 112 MICROGRAM(S): at 05:38

## 2024-07-07 RX ADMIN — ENOXAPARIN SODIUM 40 MILLIGRAM(S): 100 INJECTION SUBCUTANEOUS at 05:37

## 2024-07-07 RX ADMIN — Medication 650 MILLIGRAM(S): at 17:24

## 2024-07-07 RX ADMIN — PANTOPRAZOLE SODIUM 40 MILLIGRAM(S): 40 INJECTION, POWDER, FOR SOLUTION INTRAVENOUS at 11:49

## 2024-07-07 RX ADMIN — Medication 650 MILLIGRAM(S): at 19:20

## 2024-07-07 NOTE — PROGRESS NOTE ADULT - SUBJECTIVE AND OBJECTIVE BOX
GENERAL SURGERY PROGRESS NOTE    Patient: KG GLEZ , 68y ([birth year])Female   MRN: [id number]  Location: [address]  Visit: 07-06-24 Inpatient  Date: 07-07-24 @ 05:56    Hospital Day #:  Post-Op Day #:    Procedure/Dx/Injuries:    Events of past 24 hours:    PAST MEDICAL & SURGICAL HISTORY:  Hypothyroidism      Vertigo      History of appendectomy          Vitals:   T(F): 97.6 (07-07-24 @ 04:40), Max: 98.3 (07-06-24 @ 12:37)  HR: 59 (07-07-24 @ 04:40)  BP: 128/74 (07-07-24 @ 04:40)  RR: 18 (07-07-24 @ 04:40)  SpO2: 97% (07-07-24 @ 04:40)      Diet, Clear Liquid      Fluids: lactated ringers.: Solution, 1000 milliLiter(s) infuse at 100 mL/Hr      I & O's:      Bowel Movement: : [] YES [] NO  Flatus: : [] YES [] NO    PHYSICAL EXAM:  General: NAD, AAOx3, calm and cooperative  HEENT: NCAT, ELY, EOMI, Trachea ML, Neck supple  Cardiac: RRR S1, S2, no Murmurs, rubs or gallops  Respiratory: CTAB, normal respiratory effort, breath sounds equal BL, no wheeze, rhonchi or crackles  Abdomen: Soft, non-distended, non-tender, no rebound, no guarding. +BS.  Rectal: Good tone, +stool, no blood, no solis-anal masses/lesions, no fistulas, fissures, hemorrhoids  Musculoskeletal: Strength 5/5 BL UE/LE, ROM intact, compartments soft  Neuro: Sensation grossly intact and equal throughout, no focal deficits  Vascular: Pulses 2+ throughout, extremities well perfused  Skin: Warm/dry, normal color, no jaundice  Incision/wound: healing well, dressings in place, clean, dry and intact    MEDICATIONS  (STANDING):  acetaminophen     Tablet .. 650 milliGRAM(s) Oral every 6 hours  enoxaparin Injectable 40 milliGRAM(s) SubCutaneous every 24 hours  lactated ringers. 1000 milliLiter(s) (100 mL/Hr) IV Continuous <Continuous>  levothyroxine 112 MICROGram(s) Oral daily  pantoprazole  Injectable 40 milliGRAM(s) IV Push daily    MEDICATIONS  (PRN):  ondansetron Injectable 4 milliGRAM(s) IV Push every 6 hours PRN Nausea and/or Vomiting  oxyCODONE    IR 5 milliGRAM(s) Oral every 6 hours PRN Severe Pain (7 - 10)      DVT PROPHYLAXIS: enoxaparin Injectable 40 milliGRAM(s) SubCutaneous every 24 hours    GI PROPHYLAXIS: pantoprazole  Injectable 40 milliGRAM(s) IV Push daily    ANTICOAGULATION:   ANTIBIOTICS:            LAB/STUDIES:  Labs:  CAPILLARY BLOOD GLUCOSE      POCT Blood Glucose.: 101 mg/dL (06 Jul 2024 15:15)                          14.6   6.00  )-----------( 215      ( 06 Jul 2024 14:10 )             42.2       Auto Neutrophil %: 68.2 % (07-06-24 @ 14:10)  Auto Immature Granulocyte %: 0.3 % (07-06-24 @ 14:10)    07-06    137  |  97<L>  |  11  ----------------------------<  103<H>  4.0   |  24  |  0.8      Calcium: 9.8 mg/dL (07-06-24 @ 14:10)      LFTs:             7.1  | 0.9  | 19       ------------------[110     ( 06 Jul 2024 14:10 )  4.8  | x    | 10          Lipase:23     Amylase:x         Lactate, Blood: 1.8 mmol/L (07-06-24 @ 14:10)      Coags:     12.00  ----< 1.05    ( 06 Jul 2024 22:20 )     26.5                Urinalysis Basic - ( 06 Jul 2024 19:00 )    Color: Yellow / Appearance: Clear / SG: >1.030 / pH: x  Gluc: x / Ketone: 80 mg/dL  / Bili: Negative / Urobili: 0.2 mg/dL   Blood: x / Protein: Negative mg/dL / Nitrite: Negative   Leuk Esterase: Negative / RBC: 3 /HPF / WBC 1 /HPF   Sq Epi: x / Non Sq Epi: 0 /HPF / Bacteria: Negative /HPF                IMAGING:      ACCESS/ DEVICES:  [ ] Peripheral IV  [ ] Central Venous Line	[ ] R	[ ] L	[ ] IJ	[ ] Fem	[ ] SC	Placed:   [ ] Arterial Line		[ ] R	[ ] L	[ ] Fem	[ ] Rad	[ ] Ax	Placed:   [ ] PICC:					[ ] Mediport  [ ] Urinary Catheter,  Date Placed:   [ ] Chest tube: [ ] Right, [ ] Left  [ ] CEDRICK/Hira Drains      ASSESSMENT:  68y F w/ PMHx of  ****    PLAN:  -  -  -    Lines/Tubes: PIV, Midline, Central Line, A-Line, Chest tubes, Hira/CEDRICK drains, Harry Catheter.   GENERAL SURGERY PROGRESS NOTE    Patient: KG GLEZ , 68y ([birth year])Female   MRN: [id number]  Location: [address]  Visit: 07-06-24 Inpatient  Date: 07-07-24 @ 05:56    Hospital Day #:1  Post-Op Day #:1    Procedure/Dx/Injuries: Ileocolic intussusception    Events of past 24 hours: Patient had return abdominal pain that was crampy in nature localized in the RLQ. Last flatus and BM was yesterday, BM noted to be bloody. Repeat CT A/P performed in the ED showed long distal ileal cecal intussusception with mild distention of more proximal small bowel noted. She went into the OR for diagnostic laparoscopy with lysis of adhesions and mobilized of right colon. Lower midline laparotomy incision made. Ileocolic intussusception reduced and a small bowel mass noted about 40cm from the IC valve. A small bowel resection performed with stapled side to side anastomosis. Mesenteric defect was closed.    PAST MEDICAL & SURGICAL HISTORY:  Hypothyroidism    Vertigo    History of appendectomy      Vitals:   T(F): 97.6 (07-07-24 @ 04:40), Max: 98.3 (07-06-24 @ 12:37)  HR: 59 (07-07-24 @ 04:40)  BP: 128/74 (07-07-24 @ 04:40)  RR: 18 (07-07-24 @ 04:40)  SpO2: 97% (07-07-24 @ 04:40)      Diet, Clear Liquid      Fluids: lactated ringers.: Solution, 1000 milliLiter(s) infuse at 100 mL/Hr      I & O's:    Bowel Movement: : [] YES [x] NO  Flatus: : [] YES [x] NO    PHYSICAL EXAM:  General: NAD, AAOx3, calm and cooperative  HEENT: NCAT, ELY, EOMI, Trachea ML, Neck supple  Cardiac: RRR S1, S2, no Murmurs, rubs or gallops  Respiratory: CTAB, normal respiratory effort, breath sounds equal BL, no wheeze, rhonchi or crackles  Abdomen: Soft, non-distended, slight tenderness in epigastric area,  no rebound, no guarding.  Musculoskeletal: Strength 5/5 BL UE/LE, ROM intact  Vascular: Pulses 2+ throughout, extremities well perfused  Skin: Warm/dry, normal color, no jaundice  Incision/wound: healing well, dressings in place, clean, dry and intact    DVT PROPHYLAXIS: enoxaparin Injectable 40 milliGRAM(s) SubCutaneous every 24 hours    GI PROPHYLAXIS: pantoprazole  Injectable 40 milliGRAM(s) IV Push daily    ANTICOAGULATION:   ANTIBIOTICS:        LAB/STUDIES:  Labs:  CAPILLARY BLOOD GLUCOSE      POCT Blood Glucose.: 101 mg/dL (06 Jul 2024 15:15)                          14.6   6.00  )-----------( 215      ( 06 Jul 2024 14:10 )             42.2       Auto Neutrophil %: 68.2 % (07-06-24 @ 14:10)  Auto Immature Granulocyte %: 0.3 % (07-06-24 @ 14:10)    07-06    137  |  97<L>  |  11  ----------------------------<  103<H>  4.0   |  24  |  0.8      Calcium: 9.8 mg/dL (07-06-24 @ 14:10)      LFTs:             7.1  | 0.9  | 19       ------------------[110     ( 06 Jul 2024 14:10 )  4.8  | x    | 10          Lipase:23     Amylase:x         Lactate, Blood: 1.8 mmol/L (07-06-24 @ 14:10)      Coags:     12.00  ----< 1.05    ( 06 Jul 2024 22:20 )     26.5        Urinalysis Basic - ( 06 Jul 2024 19:00 )    Color: Yellow / Appearance: Clear / SG: >1.030 / pH: x  Gluc: x / Ketone: 80 mg/dL  / Bili: Negative / Urobili: 0.2 mg/dL   Blood: x / Protein: Negative mg/dL / Nitrite: Negative   Leuk Esterase: Negative / RBC: 3 /HPF / WBC 1 /HPF   Sq Epi: x / Non Sq Epi: 0 /HPF / Bacteria: Negative /HPF        IMAGING:    CT Abdomen and Pelvis w/ Oral Cont and w/wo IV Cont (07.06.24 @ 18:26) >  IMPRESSION:    Long distal ileal cecal intussusception with mild distention of more   proximal small bowel is noted.    No ascites or pneumoperitoneum         GENERAL SURGERY PROGRESS NOTE    Patient: KG GLEZ , 68y ([birth year])Female   MRN: [id number]  Location: [address]  Visit: 07-06-24 Inpatient  Date: 07-07-24 @ 05:56    Hospital Day #:1  Post-Op Day #:1    Procedure/Dx/Injuries: Ileocolic intussusception    Events of past 24 hours: Patient had return abdominal pain that was crampy in nature localized in the RLQ. Last flatus and BM was yesterday, BM noted to be bloody. Repeat CT A/P performed in the ED showed long distal ileal cecal intussusception with mild distention of more proximal small bowel noted. She went into the OR for diagnostic laparoscopy with lysis of adhesions and mobilized of right colon. Lower midline laparotomy incision made. Ileocolic intussusception reduced and a small bowel mass noted about 40cm from the IC valve. A small bowel resection performed with stapled side to side anastomosis. Mesenteric defect was closed.    PAST MEDICAL & SURGICAL HISTORY:  Hypothyroidism    Vertigo  History of appendectomy    Vitals:   T(F): 97.6 (07-07-24 @ 04:40), Max: 98.3 (07-06-24 @ 12:37)  HR: 59 (07-07-24 @ 04:40)  BP: 128/74 (07-07-24 @ 04:40)  RR: 18 (07-07-24 @ 04:40)  SpO2: 97% (07-07-24 @ 04:40)    Diet, Clear Liquid    Fluids: lactated ringers.: Solution, 1000 milliLiter(s) infuse at 100 mL/Hr    I & O's:  Bowel Movement: : [] YES [x] NO  Flatus: : [] YES [x] NO    PHYSICAL EXAM:  General: NAD, AAOx3, calm and cooperative  HEENT: NCAT, ELY, EOMI, Trachea ML, Neck supple  Cardiac: RRR S1, S2, no Murmurs, rubs or gallops  Respiratory: CTAB, normal respiratory effort, breath sounds equal BL, no wheeze, rhonchi or crackles  Abdomen: Soft, non-distended, slight tenderness in epigastric area,  no rebound, no guarding.  Musculoskeletal: Strength 5/5 BL UE/LE, ROM intact  Vascular: Pulses 2+ throughout, extremities well perfused  Skin: Warm/dry, normal color, no jaundice  Incision/wound: healing well, dressings in place, clean, dry and intact    DVT PROPHYLAXIS: enoxaparin Injectable 40 milliGRAM(s) SubCutaneous every 24 hours    GI PROPHYLAXIS: pantoprazole  Injectable 40 milliGRAM(s) IV Push daily    ANTICOAGULATION:   ANTIBIOTICS:      LAB/STUDIES:  CAPILLARY BLOOD GLUCOSE  POCT Blood Glucose.: 101 mg/dL (06 Jul 2024 15:15)                      14.6   6.00  )-----------( 215      ( 06 Jul 2024 14:10 )             42.2       Auto Neutrophil %: 68.2 % (07-06-24 @ 14:10)  Auto Immature Granulocyte %: 0.3 % (07-06-24 @ 14:10)    07-06  137  |  97<L>  |  11  ----------------------------<  103<H>  4.0   |  24  |  0.8  Calcium: 9.8 mg/dL (07-06-24 @ 14:10)    LFTs:           7.1  | 0.9  | 19       ------------------[110     ( 06 Jul 2024 14:10 )  4.8  | x    | 10          Lipase:23     Lactate, Blood: 1.8 mmol/L (07-06-24 @ 14:10)    Coags:     12.00  ----< 1.05    ( 06 Jul 2024 22:20 )     26.5      Urinalysis Basic - ( 06 Jul 2024 19:00 )  Color: Yellow / Appearance: Clear / SG: >1.030 / pH: x  Gluc: x / Ketone: 80 mg/dL  / Bili: Negative / Urobili: 0.2 mg/dL   Blood: x / Protein: Negative mg/dL / Nitrite: Negative   Leuk Esterase: Negative / RBC: 3 /HPF / WBC 1 /HPF   Sq Epi: x / Non Sq Epi: 0 /HPF / Bacteria: Negative /HPF    IMAGING:    CT Abdomen and Pelvis w/ Oral Cont and w/wo IV Cont (07.06.24 @ 18:26) >  IMPRESSION:    Long distal ileal cecal intussusception with mild distention of more   proximal small bowel is noted.    No ascites or pneumoperitoneum

## 2024-07-07 NOTE — CHART NOTE - NSCHARTNOTEFT_GEN_A_CORE
PACU ANESTHESIA ADMISSION NOTE      Procedure ExLap Small bowel resection  Post op diagnosis:  intesuception        __x__  Patent Airway    ____  Full return of protective reflexes    ____  Full recovery from anesthesia / back to baseline     Vitals:   T: 97.8 F          R:    11              BP:   134/87               Sat:     99%              P: 68      Mental Status:  __x__ Awake   _____ Alert   _____ Drowsy   _____ Sedated    Nausea/Vomiting:  ____ NO  ______Yes,   See Post - Op Orders          Pain Scale (0-10):  _____    Treatment: ____ None    ___x_ See Post - Op/PCA Orders    Post - Operative Fluids:   ____ Oral   __x__ See Post - Op Orders    Plan: Discharge:   ____Home       __x ___Floor     _____Critical Care    _____  Other:_________________    Comments: Pt tolerated procedure well, no anesthesia related complications. Care of pt endorsed to PACU, report given to PACU RN. Discharge when criteria are met.

## 2024-07-07 NOTE — PROGRESS NOTE ADULT - ASSESSMENT
ASSESSMENT:  68y F w/ PMHx of Hashimoto, hiatal hernia, GERD,  PSHx include appendectomy and  presents with abdominal spasms and pain for the past few days that was previously intermittent but  has become more persistent today. Pt also endorsed multiple episodes of vomiting that began yesterday of gray color that had become clear fluid since she had not eaten due to pain with PO intake. Pt was evaluated by the surgery team at the [facility] site on  with the same abdominal pain. CT A/P at [facility] showed multiple loops of dilated small bowel with transition point to collapsed distal small bowel within pelvis at level of small bowel-small bowel intussusception. Circumferential wall thickening of intussuscepted small bowel loop. Mild interloop ascites. At that time the pt was admitted for observation under surgical service at [facility] and her abdominal pain resolved overnight with flatus and increased appetite. The decision was made by pt to defer surgical intervention and follow up outpatient. However, the abdominal pain returned the past few days that is crampy in nature localized in the RLQ. Pt saw her gastroenterologist and was prescribed dicyclomine which did not improve her pain. Last flatus and BM was yesterday, BM noted to be very little and bloody. Repeat CT A/P performed last night in the ED showed Long distal ileal cecal intussusception with mild distention of more proximal small bowel is noted. Patient went into surgery for diagnostic laparoscopy that was converted to to abdominal laparotomy to reduce the ileocecal intussusception and resect a portion of her small bowel due to mass noted near the IVC. Patient post-op was feeling slight pain and discomfort in abdomen at surgical sites but otherwise doing well.       PLAN:  - Continue monitoring vitals  - Monitor post-op labs and replete as necessary  - Monitor for flatus and bowel movement  - Adjust diet to regular diet once bowel flatus and movement have happened  - Continue pain medication as needed  - Continue Abx if necessary  - Monitor wound and dressing for changes and redress as necessary  - Trial of void post-almonte removal  - DVT and GI prophylaxis   -   ASSESSMENT:  68y F w/ PMHx of Hashimoto, hiatal hernia, GERD,  PSHx include appendectomy and  presents with abdominal spasms and pain for the past few days that was previously intermittent but  has become more persistent today. Pt also endorsed multiple episodes of vomiting that began yesterday of gray color that had become clear fluid since she had not eaten due to pain with PO intake. Pt was evaluated by the surgery team at the [facility] site on  with the same abdominal pain. CT A/P at [facility] showed multiple loops of dilated small bowel with transition point to collapsed distal small bowel within pelvis at level of small bowel-small bowel intussusception. Circumferential wall thickening of intussuscepted small bowel loop. Mild interloop ascites. At that time the pt was admitted for observation under surgical service at [facility] and her abdominal pain resolved overnight with flatus and increased appetite. The decision was made by pt to defer surgical intervention and follow up outpatient. However, the abdominal pain returned the past few days that is crampy in nature localized in the RLQ. Pt saw her gastroenterologist and was prescribed dicyclomine which did not improve her pain. Last flatus and BM was yesterday, BM noted to be very little and bloody. Repeat CT A/P performed last night in the ED showed Long distal ileal cecal intussusception with mild distention of more proximal small bowel is noted. Patient went into surgery for diagnostic laparoscopy that was converted to to abdominal laparotomy to reduce the ileocecal intussusception and resect a portion of her small bowel due to mass noted near the IVC. Patient post-op was feeling slight pain and discomfort in abdomen at surgical sites but otherwise doing well.     PLAN:  - Continue monitoring vitals  - Monitor post-op labs and replete as necessary  - Monitor for flatus and bowel movement  - Adjust diet to regular diet once bowel flatus and movement have happened  - Continue pain medication as needed  - Continue Abx if necessary  - Monitor wound and dressing for changes and redress as necessary  - Trial of void post-Harry removal  - DVT and GI prophylaxis

## 2024-07-07 NOTE — BRIEF OPERATIVE NOTE - OPERATION/FINDINGS
Diagnostic laparoscopy with lysis of adhesions and mobilized of right colon. Lower midline laparotomy incision made. Ileocolic intussusception reduced and a small bowel mass noted about 40cm from the IC valve. Performed small bowel resection with stapled side to side anastomosis. Mesenteric defect closed.

## 2024-07-07 NOTE — CHART NOTE - NSCHARTNOTEFT_GEN_A_CORE
Post Operative Note  Patient: KG GLEZ 68y (1955) Female   MRN: [id number]  Location: [address]  Visit: 24 Inpatient  Date: 24 @ 06:46    Procedure:  ExLap Small bowel resection    Subjective:    Pt was seen at the bedside. Complaining of pain at the incision site, 4/10 on pain scale that is appropriate for post-operative course. 2 dressings, midline and left lateral, both clean. Harry was take out @ 6:30 AM. Awaiting TOV. No N/V, IS 1000. Pt didn't pass gas, no BM    Objective:  Vitals: T(F): 97.6 (24 @ 04:40), Max: 98.3 (24 @ 12:37)  HR: 59 (24 @ 04:40)  BP: 128/74 (24 @ 04:40) (125/73 - 145/63)  RR: 18 (24 @ 04:40)  SpO2: 97% (24 @ 04:40)    In:   24 @ 07:01  -  24 @ 06:46  --------------------------------------------------------  IN: 500 mL    IV Fluids: lactated ringers. 1000 milliLiter(s) (100 mL/Hr) IV Continuous <Continuous>    Out:   24 @ 07:  -  24 @ 06:46  --------------------------------------------------------  OUT: 1300 mL    Voided Urine:   24 @ 07:01  -  24 @ 06:46  --------------------------------------------------------  OUT: 1300 mL    Physical Examination:  General Appearance: NAD, calm  HEENT: EOMI, sclera non-icteric.  Heart: RRR, pulses are equil  Lungs: Clear lungs  Abdomen: Soft, non-tender, appropriate for post-op course, nondistended. No rigidity, guarding, or rebound tenderness. Mild hyperresonance in LUQ  MSK/Extremities: Warm & well-perfused. Peripheral pulses intact.  Skin: Warm, dry. No jaundice.   Incisions/Wounds: Dressings in place, clean, dry and intact    Medications: [Standing]  acetaminophen     Tablet .. 650 milliGRAM(s) Oral every 6 hours  enoxaparin Injectable 40 milliGRAM(s) SubCutaneous every 24 hours  lactated ringers. 1000 milliLiter(s) IV Continuous <Continuous>  levothyroxine 112 MICROGram(s) Oral daily  ondansetron Injectable 4 milliGRAM(s) IV Push every 6 hours PRN  oxyCODONE    IR 5 milliGRAM(s) Oral every 6 hours PRN  pantoprazole  Injectable 40 milliGRAM(s) IV Push daily    Medications: [PRN]  acetaminophen     Tablet .. 650 milliGRAM(s) Oral every 6 hours  enoxaparin Injectable 40 milliGRAM(s) SubCutaneous every 24 hours  lactated ringers. 1000 milliLiter(s) IV Continuous <Continuous>  levothyroxine 112 MICROGram(s) Oral daily  ondansetron Injectable 4 milliGRAM(s) IV Push every 6 hours PRN  oxyCODONE    IR 5 milliGRAM(s) Oral every 6 hours PRN  pantoprazole  Injectable 40 milliGRAM(s) IV Push daily    DVT PROPHYLAXIS: enoxaparin Injectable 40 milliGRAM(s) SubCutaneous every 24 hours    GI PROPHYLAXIS: pantoprazole  Injectable 40 milliGRAM(s) IV Push daily    ANTICOAGULATION:   ANTIBIOTICS:      Labs:                        14.6   6.00  )-----------( 215      ( 2024 14:10 )             42.2     07-    137  |  97<L>  |  11  ----------------------------<  103<H>  4.0   |  24  |  0.8    Ca    9.8      2024 14:10  Mg     1.9         TPro  7.1  /  Alb  4.8  /  TBili  0.9  /  DBili  x   /  AST  19  /  ALT  10  /  AlkPhos  110      PT/INR - ( 2024 22:20 )   PT: 12.00 sec;   INR: 1.05 ratio      PTT - ( 2024 22:20 )  PTT:26.5 sec    Assessment:  68y F with PMH of Hashimoto, hiatal hernia, GERD, PSHx include appendectomy and , s/p ExLap Small bowel resection    Plan:    - Monitor vitals  - Monitor post-op labs and replete as necessary  - Monitor for bowel function  - Continue Pain Medications if necessary  - Encourage ambulation as tolerated  - Harry out @ 6:30 AM, F/U on TOV within 6h  - Monitor dressing for changes, redress as needed.      Date/Time: 24 @ 06:46

## 2024-07-08 LAB
ANION GAP SERPL CALC-SCNC: 18 MMOL/L — HIGH (ref 7–14)
BUN SERPL-MCNC: 8 MG/DL — LOW (ref 10–20)
CALCIUM SERPL-MCNC: 8.9 MG/DL — SIGNIFICANT CHANGE UP (ref 8.4–10.4)
CHLORIDE SERPL-SCNC: 104 MMOL/L — SIGNIFICANT CHANGE UP (ref 98–110)
CO2 SERPL-SCNC: 17 MMOL/L — SIGNIFICANT CHANGE UP (ref 17–32)
CREAT SERPL-MCNC: 0.9 MG/DL — SIGNIFICANT CHANGE UP (ref 0.7–1.5)
CULTURE RESULTS: SIGNIFICANT CHANGE UP
EGFR: 70 ML/MIN/1.73M2 — SIGNIFICANT CHANGE UP
GLUCOSE SERPL-MCNC: 79 MG/DL — SIGNIFICANT CHANGE UP (ref 70–99)
HCT VFR BLD CALC: 34.1 % — LOW (ref 37–47)
HGB BLD-MCNC: 11.2 G/DL — LOW (ref 12–16)
MAGNESIUM SERPL-MCNC: 1.9 MG/DL — SIGNIFICANT CHANGE UP (ref 1.8–2.4)
MCHC RBC-ENTMCNC: 29.2 PG — SIGNIFICANT CHANGE UP (ref 27–31)
MCHC RBC-ENTMCNC: 32.8 G/DL — SIGNIFICANT CHANGE UP (ref 32–37)
MCV RBC AUTO: 89 FL — SIGNIFICANT CHANGE UP (ref 81–99)
NRBC # BLD: 0 /100 WBCS — SIGNIFICANT CHANGE UP (ref 0–0)
PHOSPHATE SERPL-MCNC: 2.9 MG/DL — SIGNIFICANT CHANGE UP (ref 2.1–4.9)
PLATELET # BLD AUTO: 158 K/UL — SIGNIFICANT CHANGE UP (ref 130–400)
PMV BLD: 12.1 FL — HIGH (ref 7.4–10.4)
POTASSIUM SERPL-MCNC: 3.8 MMOL/L — SIGNIFICANT CHANGE UP (ref 3.5–5)
POTASSIUM SERPL-SCNC: 3.8 MMOL/L — SIGNIFICANT CHANGE UP (ref 3.5–5)
RBC # BLD: 3.83 M/UL — LOW (ref 4.2–5.4)
RBC # FLD: 13.2 % — SIGNIFICANT CHANGE UP (ref 11.5–14.5)
SODIUM SERPL-SCNC: 139 MMOL/L — SIGNIFICANT CHANGE UP (ref 135–146)
SPECIMEN SOURCE: SIGNIFICANT CHANGE UP
WBC # BLD: 7.72 K/UL — SIGNIFICANT CHANGE UP (ref 4.8–10.8)
WBC # FLD AUTO: 7.72 K/UL — SIGNIFICANT CHANGE UP (ref 4.8–10.8)

## 2024-07-08 PROCEDURE — 99232 SBSQ HOSP IP/OBS MODERATE 35: CPT | Mod: 24,25

## 2024-07-08 PROCEDURE — 93971 EXTREMITY STUDY: CPT | Mod: 26,LT

## 2024-07-08 RX ORDER — PANTOPRAZOLE SODIUM 40 MG/10ML
40 INJECTION, POWDER, FOR SOLUTION INTRAVENOUS
Refills: 0 | Status: DISCONTINUED | OUTPATIENT
Start: 2024-07-08 | End: 2024-07-09

## 2024-07-08 RX ADMIN — DEXTROSE MONOHYDRATE AND SODIUM CHLORIDE 100 MILLILITER(S): 5; .3 INJECTION, SOLUTION INTRAVENOUS at 08:24

## 2024-07-08 RX ADMIN — Medication 650 MILLIGRAM(S): at 06:16

## 2024-07-08 RX ADMIN — Medication 112 MICROGRAM(S): at 05:34

## 2024-07-08 RX ADMIN — Medication 650 MILLIGRAM(S): at 11:48

## 2024-07-08 RX ADMIN — Medication 650 MILLIGRAM(S): at 17:12

## 2024-07-08 RX ADMIN — ENOXAPARIN SODIUM 40 MILLIGRAM(S): 100 INJECTION SUBCUTANEOUS at 05:32

## 2024-07-08 RX ADMIN — Medication 650 MILLIGRAM(S): at 01:34

## 2024-07-08 RX ADMIN — DEXTROSE MONOHYDRATE AND SODIUM CHLORIDE 100 MILLILITER(S): 5; .3 INJECTION, SOLUTION INTRAVENOUS at 21:01

## 2024-07-08 RX ADMIN — Medication 650 MILLIGRAM(S): at 23:30

## 2024-07-08 RX ADMIN — Medication 650 MILLIGRAM(S): at 05:35

## 2024-07-08 RX ADMIN — Medication 650 MILLIGRAM(S): at 13:05

## 2024-07-08 NOTE — PROVIDER CONTACT NOTE (OTHER) - SITUATION
patient c/o lump on left upper arm
patient IV was removed, patient is refusing IV by nurse stated she is a HARD STICK AND WOULD LIKE a IV to be placed by sonogram, as her other IV was inserted by sonogram

## 2024-07-08 NOTE — PROGRESS NOTE ADULT - ASSESSMENT
ASSESSMENT:  68y F w/ PMHx of Hashimoto, hiatal hernia, GERD,  PSHx include appendectomy and  presents with abdominal spasms and pain for the past few days that was previously intermittent but  has become more persistent today.      PLAN:  - Advance the diet as soon as pt passes G or have BM  - Encourage IS  - Mobilisation as tolerating  - Monitor vitals  - Monitor electrolytes replete as needed

## 2024-07-08 NOTE — PROGRESS NOTE ADULT - SUBJECTIVE AND OBJECTIVE BOX
GENERAL SURGERY PROGRESS NOTE    Patient: KG GLEZ , 68y ([birth year])Female   MRN: [id number]  Location: [address]  Visit: 07-06-24 Inpatient  Date: 07-08-24 @ 03:28    Hospital Day #: 2  Post-Op Day #: 2     Procedure/Dx/Injuries:    Ileocolic intussusception    Events of past 24 hours:  No acute events over night, pt was seen at the bedside. Pt tolerates CLD well, no G or BM, will monitor Pulls 1000 on IS, encourage to do 10X/1h. Advised the pt to mobilise. Pt received pelvic binder.    PAST MEDICAL & SURGICAL HISTORY:  Hypothyroidism  Vertigo  History of appendectomy    Vitals:   T(F): 97.8 (07-07-24 @ 23:58), Max: 98.2 (07-07-24 @ 16:06)  HR: 60 (07-07-24 @ 23:58)  BP: 149/88 (07-07-24 @ 23:58)  RR: 18 (07-07-24 @ 23:58)  SpO2: 99% (07-07-24 @ 23:58)    Diet, Clear Liquid      Fluids:     I & O's:    07-06-24 @ 07:01  -  07-07-24 @ 07:00  --------------------------------------------------------  IN:    Lactated Ringers: 500 mL  Total IN: 500 mL    OUT:    Indwelling Catheter - Urethral (mL): 300 mL    Voided (mL): 1000 mL  Total OUT: 1300 mL    Total NET: -800 mL        Bowel Movement: : [] YES [X] NO  Flatus: : [] YES [X] NO    PHYSICAL EXAM:  General: NAD, calm and cooperative  HEENT: Trachea ML, Neck supple  Cardiac: RRR, no Murmurs, rubs or gallops  Respiratory: normal respiratory effort, breath sounds equal BL, no wheeze, rhonchi or crackles, no accessory muscle use  Abdomen: Abdominal dressing in place. Soft, non-distended, non-tender  Musculoskeletal: Strength 5/5 BL UE/LE, ROM intact  Vascular: Pulses 2+ throughout, extremities well perfused  Skin: Warm/dry, normal color, no jaundice  Incision/wound: Dressings in place, clean, dry and intact    MEDICATIONS  (STANDING):  acetaminophen     Tablet .. 650 milliGRAM(s) Oral every 6 hours  enoxaparin Injectable 40 milliGRAM(s) SubCutaneous every 24 hours  lactated ringers. 1000 milliLiter(s) (100 mL/Hr) IV Continuous <Continuous>  levothyroxine 112 MICROGram(s) Oral daily  pantoprazole  Injectable 40 milliGRAM(s) IV Push daily    MEDICATIONS  (PRN):  ondansetron Injectable 4 milliGRAM(s) IV Push every 6 hours PRN Nausea and/or Vomiting  oxyCODONE    IR 5 milliGRAM(s) Oral every 6 hours PRN Severe Pain (7 - 10)    DVT PROPHYLAXIS: enoxaparin Injectable 40 milliGRAM(s) SubCutaneous every 24 hours    GI PROPHYLAXIS: pantoprazole  Injectable 40 milliGRAM(s) IV Push daily    LAB/STUDIES:  Labs:  CAPILLARY BLOOD GLUCOSE                        13.0   10.69 )-----------( 236      ( 07 Jul 2024 21:00 )             38.6         07-07    140  |  101  |  9<L>  ----------------------------<  90  3.8   |  24  |  0.8      Calcium: 9.2 mg/dL (07-07-24 @ 21:00)      LFTs:             7.1  | 0.9  | 19       ------------------[110     ( 06 Jul 2024 14:10 )  4.8  | x    | 10          Lipase:23     Amylase:x         Lactate, Blood: 1.8 mmol/L (07-06-24 @ 14:10)      Coags:     12.00  ----< 1.05    ( 06 Jul 2024 22:20 )     26.5      Urinalysis Basic - ( 07 Jul 2024 21:00 )    Color: x / Appearance: x / SG: x / pH: x  Gluc: 90 mg/dL / Ketone: x  / Bili: x / Urobili: x   Blood: x / Protein: x / Nitrite: x   Leuk Esterase: x / RBC: x / WBC x   Sq Epi: x / Non Sq Epi: x / Bacteria: x

## 2024-07-09 LAB
BASOPHILS # BLD AUTO: 0.05 K/UL — SIGNIFICANT CHANGE UP (ref 0–0.2)
BASOPHILS NFR BLD AUTO: 0.8 % — SIGNIFICANT CHANGE UP (ref 0–1)
EOSINOPHIL # BLD AUTO: 0.18 K/UL — SIGNIFICANT CHANGE UP (ref 0–0.7)
EOSINOPHIL NFR BLD AUTO: 2.8 % — SIGNIFICANT CHANGE UP (ref 0–8)
HCT VFR BLD CALC: 32.3 % — LOW (ref 37–47)
HGB BLD-MCNC: 10.9 G/DL — LOW (ref 12–16)
IMM GRANULOCYTES NFR BLD AUTO: 0.3 % — SIGNIFICANT CHANGE UP (ref 0.1–0.3)
LYMPHOCYTES # BLD AUTO: 1.79 K/UL — SIGNIFICANT CHANGE UP (ref 1.2–3.4)
LYMPHOCYTES # BLD AUTO: 27.9 % — SIGNIFICANT CHANGE UP (ref 20.5–51.1)
MCHC RBC-ENTMCNC: 29.5 PG — SIGNIFICANT CHANGE UP (ref 27–31)
MCHC RBC-ENTMCNC: 33.7 G/DL — SIGNIFICANT CHANGE UP (ref 32–37)
MCV RBC AUTO: 87.5 FL — SIGNIFICANT CHANGE UP (ref 81–99)
MONOCYTES # BLD AUTO: 0.43 K/UL — SIGNIFICANT CHANGE UP (ref 0.1–0.6)
MONOCYTES NFR BLD AUTO: 6.7 % — SIGNIFICANT CHANGE UP (ref 1.7–9.3)
NEUTROPHILS # BLD AUTO: 3.94 K/UL — SIGNIFICANT CHANGE UP (ref 1.4–6.5)
NEUTROPHILS NFR BLD AUTO: 61.5 % — SIGNIFICANT CHANGE UP (ref 42.2–75.2)
NRBC # BLD: 0 /100 WBCS — SIGNIFICANT CHANGE UP (ref 0–0)
PLATELET # BLD AUTO: 180 K/UL — SIGNIFICANT CHANGE UP (ref 130–400)
PMV BLD: 12.6 FL — HIGH (ref 7.4–10.4)
RBC # BLD: 3.69 M/UL — LOW (ref 4.2–5.4)
RBC # FLD: 13.1 % — SIGNIFICANT CHANGE UP (ref 11.5–14.5)
WBC # BLD: 6.41 K/UL — SIGNIFICANT CHANGE UP (ref 4.8–10.8)
WBC # FLD AUTO: 6.41 K/UL — SIGNIFICANT CHANGE UP (ref 4.8–10.8)

## 2024-07-09 PROCEDURE — 99232 SBSQ HOSP IP/OBS MODERATE 35: CPT | Mod: 25,24

## 2024-07-09 RX ORDER — PANTOPRAZOLE SODIUM 40 MG/10ML
40 INJECTION, POWDER, FOR SOLUTION INTRAVENOUS
Refills: 0 | Status: DISCONTINUED | OUTPATIENT
Start: 2024-07-09 | End: 2024-07-10

## 2024-07-09 RX ORDER — FAMOTIDINE 40 MG
20 TABLET ORAL DAILY
Refills: 0 | Status: DISCONTINUED | OUTPATIENT
Start: 2024-07-09 | End: 2024-07-10

## 2024-07-09 RX ADMIN — Medication 112 MICROGRAM(S): at 05:43

## 2024-07-09 RX ADMIN — PANTOPRAZOLE SODIUM 40 MILLIGRAM(S): 40 INJECTION, POWDER, FOR SOLUTION INTRAVENOUS at 05:43

## 2024-07-09 RX ADMIN — Medication 650 MILLIGRAM(S): at 23:33

## 2024-07-09 RX ADMIN — ENOXAPARIN SODIUM 40 MILLIGRAM(S): 100 INJECTION SUBCUTANEOUS at 05:45

## 2024-07-09 RX ADMIN — Medication 650 MILLIGRAM(S): at 06:13

## 2024-07-09 RX ADMIN — Medication 650 MILLIGRAM(S): at 00:00

## 2024-07-09 RX ADMIN — Medication 650 MILLIGRAM(S): at 13:07

## 2024-07-09 RX ADMIN — PANTOPRAZOLE SODIUM 40 MILLIGRAM(S): 40 INJECTION, POWDER, FOR SOLUTION INTRAVENOUS at 17:38

## 2024-07-09 RX ADMIN — Medication 650 MILLIGRAM(S): at 23:03

## 2024-07-09 RX ADMIN — Medication 650 MILLIGRAM(S): at 05:43

## 2024-07-09 RX ADMIN — Medication 650 MILLIGRAM(S): at 11:56

## 2024-07-09 NOTE — PROGRESS NOTE ADULT - SUBJECTIVE AND OBJECTIVE BOX
ACUTE CARE SURGERY PROGRESS NOTE    Patient: KG GLEZ , 68y ([birth year])Female   MRN: [id number]  Location: [address]  Visit: 24 Inpatient  Date: 24 @ 02:29    Hospital Day #: 4  Post-Op Day #: 2    Procedure/Dx/Injuries: recurrent intussusception s/p SBR w/ resection of mass     Events of past 24 hours:  Hgb dropped 13 to 11.2. VSS. Tolerating CLD, no N/V. No BM/flatus     PAST MEDICAL & SURGICAL HISTORY:  Hypothyroidism      Vertigo      History of appendectomy          Vitals:   T(F): 98.1 (24 @ 23:37), Max: 98.8 (24 @ 16:12)  HR: 57 (24 @ 23:37)  BP: 120/71 (24 @ 23:37)  RR: 18 (24 @ 23:37)  SpO2: 97% (24 @ 23:37)      Diet, Clear Liquid      Fluids:     I & O's:    24 @ 07:01  -  24 @ 07:00  --------------------------------------------------------  IN:    Lactated Ringers: 700 mL    Oral Fluid: 120 mL  Total IN: 820 mL    OUT:    Voided (mL): 200 mL  Total OUT: 200 mL    Total NET: 620 mL        Bowel Movement: : [] YES [] NO  Flatus: : [] YES [] NO    PHYSICAL EXAM:  General: NAD  HEENT: Normocephalic, atraumatic, EOMI  Neck: Soft, midline trachea  Cardiac: S1, S2  Respiratory: No labored breathing, equal chest rise and fall   Abdomen: Soft, non-distended, tenderness around incision site. hematoma around the incision site.  Ext: L. upper arm hematoma w/ palpable small lump     MEDICATIONS  (STANDING):  acetaminophen     Tablet .. 650 milliGRAM(s) Oral every 6 hours  enoxaparin Injectable 40 milliGRAM(s) SubCutaneous every 24 hours  lactated ringers. 1000 milliLiter(s) (100 mL/Hr) IV Continuous <Continuous>  levothyroxine 112 MICROGram(s) Oral daily  pantoprazole    Tablet 40 milliGRAM(s) Oral before breakfast    MEDICATIONS  (PRN):  ondansetron Injectable 4 milliGRAM(s) IV Push every 6 hours PRN Nausea and/or Vomiting  oxyCODONE    IR 5 milliGRAM(s) Oral every 6 hours PRN Severe Pain (7 - 10)      DVT PROPHYLAXIS: SCDs, enoxaparin Injectable 40 milliGRAM(s) SubCutaneous every 24 hours    GI PROPHYLAXIS: pantoprazole    Tablet 40 milliGRAM(s) Oral before breakfast            LAB/STUDIES:  Labs:  CAPILLARY BLOOD GLUCOSE                              11.2   7.72  )-----------( 158      ( 2024 21:28 )             34.1             139  |  104  |  8<L>  ----------------------------<  79  3.8   |  17  |  0.9      Calcium: 8.9 mg/dL (24 @ 21:28)      LFTs:     Lactate, Blood: 1.8 mmol/L (24 @ 14:10)      Coags:            Urinalysis Basic - ( 2024 21:28 )    Color: x / Appearance: x / SG: x / pH: x  Gluc: 79 mg/dL / Ketone: x  / Bili: x / Urobili: x   Blood: x / Protein: x / Nitrite: x   Leuk Esterase: x / RBC: x / WBC x   Sq Epi: x / Non Sq Epi: x / Bacteria: x        Culture - Urine (collected 2024 19:00)  Source: Clean Catch Clean Catch (Midstream)  Final Report (2024 07:50):    <10,000 CFU/mL Normal Urogenital Nevaeh        ASSESSMENT:  68y F w/ PMHx of Hashimoto, hiatal hernia, GERD,  PSHx include appendectomy and  presents with abdominal spasms s/p SBR w/ resection of mass for recurrent intussusception     Plan:  - f/u LUE DVT US 	  - supportive care  - GI/DVT prophylaxis  - clear liquids diet  - pain management  - incentive spirometer    - follow up consults  - out of bed to chair and ambulate  - repeat studies as needed  - replace electrolytes  - case management evaluation   - f/u pathology outpt       TRAUMA SPECTRA: [id number]

## 2024-07-10 ENCOUNTER — TRANSCRIPTION ENCOUNTER (OUTPATIENT)
Age: 69
End: 2024-07-10

## 2024-07-10 VITALS
DIASTOLIC BLOOD PRESSURE: 80 MMHG | TEMPERATURE: 98 F | SYSTOLIC BLOOD PRESSURE: 120 MMHG | OXYGEN SATURATION: 99 % | RESPIRATION RATE: 18 BRPM | HEART RATE: 63 BPM

## 2024-07-10 LAB
ANION GAP SERPL CALC-SCNC: 16 MMOL/L — HIGH (ref 7–14)
BUN SERPL-MCNC: 8 MG/DL — LOW (ref 10–20)
CALCIUM SERPL-MCNC: 8.8 MG/DL — SIGNIFICANT CHANGE UP (ref 8.4–10.5)
CHLORIDE SERPL-SCNC: 101 MMOL/L — SIGNIFICANT CHANGE UP (ref 98–110)
CO2 SERPL-SCNC: 21 MMOL/L — SIGNIFICANT CHANGE UP (ref 17–32)
CREAT SERPL-MCNC: 0.7 MG/DL — SIGNIFICANT CHANGE UP (ref 0.7–1.5)
EGFR: 94 ML/MIN/1.73M2 — SIGNIFICANT CHANGE UP
GLUCOSE SERPL-MCNC: 85 MG/DL — SIGNIFICANT CHANGE UP (ref 70–99)
MAGNESIUM SERPL-MCNC: 1.8 MG/DL — SIGNIFICANT CHANGE UP (ref 1.8–2.4)
PHOSPHATE SERPL-MCNC: 3.1 MG/DL — SIGNIFICANT CHANGE UP (ref 2.1–4.9)
POTASSIUM SERPL-MCNC: 3.6 MMOL/L — SIGNIFICANT CHANGE UP (ref 3.5–5)
POTASSIUM SERPL-SCNC: 3.6 MMOL/L — SIGNIFICANT CHANGE UP (ref 3.5–5)
SODIUM SERPL-SCNC: 138 MMOL/L — SIGNIFICANT CHANGE UP (ref 135–146)

## 2024-07-10 PROCEDURE — 99232 SBSQ HOSP IP/OBS MODERATE 35: CPT | Mod: 24,25

## 2024-07-10 RX ORDER — SUCRALFATE 1 G
1 TABLET ORAL
Refills: 0 | Status: DISCONTINUED | OUTPATIENT
Start: 2024-07-10 | End: 2024-07-10

## 2024-07-10 RX ORDER — ACETAMINOPHEN 325 MG
2 TABLET ORAL
Qty: 0 | Refills: 0 | DISCHARGE
Start: 2024-07-10

## 2024-07-10 RX ADMIN — Medication 650 MILLIGRAM(S): at 05:18

## 2024-07-10 RX ADMIN — Medication 20 MILLIGRAM(S): at 11:32

## 2024-07-10 RX ADMIN — Medication 650 MILLIGRAM(S): at 11:32

## 2024-07-10 RX ADMIN — Medication 650 MILLIGRAM(S): at 05:48

## 2024-07-10 RX ADMIN — Medication 650 MILLIGRAM(S): at 12:16

## 2024-07-10 RX ADMIN — Medication 112 MICROGRAM(S): at 05:18

## 2024-07-10 RX ADMIN — ENOXAPARIN SODIUM 40 MILLIGRAM(S): 100 INJECTION SUBCUTANEOUS at 05:18

## 2024-07-10 RX ADMIN — PANTOPRAZOLE SODIUM 40 MILLIGRAM(S): 40 INJECTION, POWDER, FOR SOLUTION INTRAVENOUS at 05:18

## 2024-07-10 NOTE — DISCHARGE NOTE PROVIDER - HOSPITAL COURSE
68y female with PMH of Hashimoto, hiatal hernia, GERD,  PSHx include appendectomy and  presents with abdominal spasms and pain for the past few days that was previously intermittent but  has become more persistent today. Pt also endorses multiple episodes of vomiting that began yesterday of gray color that has become clear fluid today since she has not eaten due to pain with PO intake. Pt was evaluated by the surgery team at the [facility] site on  with the same chief complaint of abdominal pain. CT A/P at that time showed Multiple loops of dilated small bowel, up to 3.7 cm with transition point to collapsed distal small bowel within pelvis at level of small bowel-small bowel intussusception. Circumferential wall thickening of intussuscepted small bowel loop. Mild interloop ascites. No pneumoperitoneum. Pt was admitted for observation under surgical service at [facility], abdominal pain resolved overnight with flatus and increased appetite. Decision was made by pt to defer surgical intervention and follow up outpatient. However, the abdominal pain returned the past few days that is crampy in nature localized in the RLQ. Pt saw her gastroenterologist and was prescribed dicyclomine which did not improve her pain. Last flatus and BM was yesterday, BM noted to be bloody. Repeat CT A/P performed today in the ED showed Long distal ileal cecal intussusception with mild distention of more proximal small bowel is noted. No ascites or pneumoperitoneum. Admitted to surgical service for operative management. Taken to OR for diagnostic laparoscopy with lysis of adhesions, resection of small bowel resection with side to side anastomosis. No intra-op complications. Stable post-operatively and transferred to floor.    Patient's diet was slowly advanced to regular diet, which she is tolerating well without any nausea or vomiting. Having flatus and bowel movements. Ambulating independently and voiding spontaneously. She is hemodynamically stable and ready to be discharged home today. Follow up in office within 2 weeks.

## 2024-07-10 NOTE — PROGRESS NOTE ADULT - REASON FOR ADMISSION
recurrent intussusception

## 2024-07-10 NOTE — DISCHARGE NOTE PROVIDER - NSDCCPCAREPLAN_GEN_ALL_CORE_FT
PRINCIPAL DISCHARGE DIAGNOSIS  Diagnosis: Intussusception  Assessment and Plan of Treatment: Diet: Continue regular diet  Activity: Ambulate and get out of bed as tolerated, and with assistance if feeling weak. No heavy lifting > 10 lbs for 6 weeks. Avoid straining or excessive activity x 6 weeks.   Dressings: Remove outer dressings in 48 hours and steri strips underneath will fall off on their own within 1 week. Do not scrub wounds. You may shower but do not bathe. May use ice packs for pain and swelling.   Pain: You can take over the counter medications such as Tylenol, and Ibuprofen for pain control. Please adhere to the instructions on the back of the bottle.   Home meds: Can continue home meds as usual.   Follow up: Please follow up in surgery clinic in 1-2 weeks. Please call [phone] to make an appointment.  If you develop fevers, chills, worsening pain, increased drainage from the wound, foul smelling drainage from the wound, nausea that won't subside, vomiting, or any other symptoms of concern, please call MD or return to the ED for further advice, evaluation, and/or treatment.      SECONDARY DISCHARGE DIAGNOSES  Diagnosis: Abdominal pain  Assessment and Plan of Treatment:      no

## 2024-07-10 NOTE — PROGRESS NOTE ADULT - ASSESSMENT
ASSESSMENT:  68y F w/ PMHx of Hashimoto, hiatal hernia, GERD,  PSHx include appendectomy and  presents with abdominal spasms s/p SBR w/ resection of mass for recurrent intussusception    PLAN:  - Advanced to regular diet, f/u on how tolerate  - Adding Carafate BID  - Continue PPI for heartburns  - Monitor vitals  - Monitor electrolyte replete if needed  - Encourage mobilization and walking  - Encourage spirometer use  - Supportive care   - F/u pathology outpt      ASSESSMENT:  68y F w/ PMHx of Hashimoto, hiatal hernia, GERD,  PSHx include appendectomy and  presents with abdominal spasms s/p SBR w/ resection of mass for recurrent intussusception    PLAN:  - Advanced to regular diet, f/u on how tolerate  - Adding Carafate BID  - Continue PPI for heartburns  - Monitor vitals  - Monitor electrolyte replete if needed  - Encourage mobilization and walking  - Encourage spirometer use  - Supportive care   - F/u pathology outpatient

## 2024-07-10 NOTE — DISCHARGE NOTE PROVIDER - NSDCMRMEDTOKEN_GEN_ALL_CORE_FT
acetaminophen 325 mg oral tablet: 2 tab(s) orally every 6 hours  ondansetron 4 mg oral tablet, disintegratin tab(s) orally every 8 hours as needed for nausea/vomiting  sucralfate 1 g oral tablet: 1 tab(s) orally 4 times a day (before meals and at bedtime)  Synthroid 112 mcg (0.112 mg) oral tablet: 1 tab(s) orally 5 times a week pt was instructed by PCP to wean dose by taking levothyroxine 5 times a week, do not take on weekends and to take 56mg (half the usual dose) on

## 2024-07-10 NOTE — DISCHARGE NOTE PROVIDER - NSFOLLOWUPCLINICS_GEN_ALL_ED_FT
[facility]  Surgery  [address] [place] [address]  Phone: [phone]  Fax:   Follow Up Time: 2 weeks

## 2024-07-10 NOTE — DISCHARGE NOTE PROVIDER - CARE PROVIDER_API CALL
Ann-Marie Elizalde  Surgical Critical Care  [address] [place] [address], [address] [place] [address]  Phone: [phone]  Fax: [phone]  Follow Up Time: 2 weeks

## 2024-07-10 NOTE — PROGRESS NOTE ADULT - SUBJECTIVE AND OBJECTIVE BOX
GENERAL SURGERY PROGRESS NOTE    Patient: KG GLEZ , 68y ([birth year])Female   MRN: [id number]  Location: [address]  Visit: 07-06-24 Inpatient  Date: 07-10-24 @ 09:07    Hospital Day #: 5  Post-Op Day #: 4    Procedure/Dx/Injuries:    Recurrent intussusception s/p SBR w/ resection of mass     Events of past 24 hours:    No acute events overnight. Pt was seen and examen at the bedside. Pt son BM tonight. Pt was tolerating FLD well, advancing to regular diet. Will f/u how tolerates. Adding Carafate BID for heartburn sensation.    PAST MEDICAL & SURGICAL HISTORY:  Hypothyroidism  Vertigo  History of appendectomy    Vitals:   T(F): 98.3 (07-10-24 @ 07:59), Max: 98.6 (07-09-24 @ 20:42)  HR: 63 (07-10-24 @ 07:59)  BP: 120/80 (07-10-24 @ 07:59)  RR: 18 (07-10-24 @ 07:59)  SpO2: 99% (07-10-24 @ 07:59)    Diet, Regular    Bowel Movement: : [x] YES [] NO  Flatus: : [x] YES [] NO    PHYSICAL EXAM:  General: NAD, AAOx3, calm and cooperative  HEENT: EOMI, Trachea ML, Neck supple  Respiratory: No in acute distress, no accessory muscle use, normal respiratory effort  Abdomen: Soft, non-distended, non-tender  Musculoskeletal: LE swelling b/l, per pt it is her usual. ROM intact,   Skin: Warm/dry, normal color, no jaundice  Incision/wound: healing well    MEDICATIONS  (STANDING):  acetaminophen     Tablet .. 650 milliGRAM(s) Oral every 6 hours  enoxaparin Injectable 40 milliGRAM(s) SubCutaneous every 24 hours  famotidine    Tablet 20 milliGRAM(s) Oral daily  levothyroxine 112 MICROGram(s) Oral daily  pantoprazole    Tablet 40 milliGRAM(s) Oral two times a day  sucralfate 1 Gram(s) Oral two times a day    MEDICATIONS  (PRN):  ondansetron Injectable 4 milliGRAM(s) IV Push every 6 hours PRN Nausea and/or Vomiting    DVT PROPHYLAXIS: enoxaparin Injectable 40 milliGRAM(s) SubCutaneous every 24 hours  GI PROPHYLAXIS: pantoprazole    Tablet 40 milliGRAM(s) Oral two times a day    LAB/STUDIES:  Labs:  CAPILLARY BLOOD GLUCOSE                     10.9   6.41  )-----------( 180      ( 09 Jul 2024 22:38 )             32.3       Auto Neutrophil %: 61.5 % (07-09-24 @ 22:38)  Auto Immature Granulocyte %: 0.3 % (07-09-24 @ 22:38)    07-09    138  |  101  |  8<L>  ----------------------------<  85  3.6   |  21  |  0.7      Calcium: 8.8 mg/dL (07-09-24 @ 22:38)    IMAGING:  VA Duplex Upper Ext Vein Scan, Left (07.08.24 @ 17:43)  No evidence of left upper extremity deep venous thrombosis. Negative for   superficial thrombophlebitis. GENERAL SURGERY PROGRESS NOTE    Patient: KG GLEZ , 68y ([birth year])Female   MRN: [id number]  Location: [address]  Visit: 07-06-24 Inpatient  Date: 07-10-24 @ 09:07    Hospital Day #: 5  Post-Op Day #: 4    Procedure/Dx/Injuries:    Recurrent intussusception s/p SBR w/ resection of mass     Events of past 24 hours:    No acute events overnight. Pt was seen and examen at the bedside. Patient had BM tonight. Patient was tolerating Full liquids diet well, advancing to regular diet. Will f/u how tolerates. Adding Carafate BID for resistant GERD symptoms        PAST MEDICAL & SURGICAL HISTORY:  Hypothyroidism  Vertigo  History of appendectomy    Vitals:   T(F): 98.3 (07-10-24 @ 07:59), Max: 98.6 (07-09-24 @ 20:42)  HR: 63 (07-10-24 @ 07:59)  BP: 120/80 (07-10-24 @ 07:59)  RR: 18 (07-10-24 @ 07:59)  SpO2: 99% (07-10-24 @ 07:59)    Diet, Regular    Bowel Movement: : [x] YES [] NO  Flatus: : [x] YES [] NO    PHYSICAL EXAM:  General: NAD, AAOx3, calm and cooperative  HEENT: EOMI, Trachea ML, Neck supple  Respiratory: No in acute distress, no accessory muscle use, normal respiratory effort  Abdomen: Soft, non-distended, non-tender  Musculoskeletal: LE swelling b/l, per pt it is her usual. ROM intact,   Skin: Warm/dry, normal color, no jaundice  Incision/wound: healing well    MEDICATIONS  (STANDING):  acetaminophen     Tablet .. 650 milliGRAM(s) Oral every 6 hours  enoxaparin Injectable 40 milliGRAM(s) SubCutaneous every 24 hours  famotidine    Tablet 20 milliGRAM(s) Oral daily  levothyroxine 112 MICROGram(s) Oral daily  pantoprazole    Tablet 40 milliGRAM(s) Oral two times a day  sucralfate 1 Gram(s) Oral two times a day    MEDICATIONS  (PRN):  ondansetron Injectable 4 milliGRAM(s) IV Push every 6 hours PRN Nausea and/or Vomiting    DVT PROPHYLAXIS: enoxaparin Injectable 40 milliGRAM(s) SubCutaneous every 24 hours  GI PROPHYLAXIS: pantoprazole    Tablet 40 milliGRAM(s) Oral two times a day    LAB/STUDIES:  Labs:  CAPILLARY BLOOD GLUCOSE                     10.9   6.41  )-----------( 180      ( 09 Jul 2024 22:38 )             32.3       Auto Neutrophil %: 61.5 % (07-09-24 @ 22:38)  Auto Immature Granulocyte %: 0.3 % (07-09-24 @ 22:38)    07-09    138  |  101  |  8<L>  ----------------------------<  85  3.6   |  21  |  0.7  Calcium: 8.8 mg/dL (07-09-24 @ 22:38)    IMAGING:  VA Duplex Upper Ext Vein Scan, Left (07.08.24 @ 17:43)  No evidence of left upper extremity deep venous thrombosis. Negative for   superficial thrombophlebitis.

## 2024-07-10 NOTE — PROGRESS NOTE ADULT - ATTENDING COMMENTS
ACS Attending  Note Attestation    Patient is examined and evaluated at the bedside with the residents/PAs. Treatment plan discussed with the team, nurses, and consulting physicians and consulting teams. Medications, radiological studies and all other relevant studies reviewed.     KG GLEZ Patient is a 68y old  Female who presents with a chief complaint of recurrent intussusception due to mass, S/P exploratory laparotomy, small bowel resection with primary anastomosis     Vital Signs Last 24 Hrs  T(C): 37.1 (08 Jul 2024 16:12), Max: 37.1 (08 Jul 2024 16:12)  T(F): 98.8 (08 Jul 2024 16:12), Max: 98.8 (08 Jul 2024 16:12)  HR: 60 (08 Jul 2024 16:12) (55 - 60)  BP: 123/80 (08 Jul 2024 16:12) (123/80 - 149/88)  BP(mean): --  RR: 18 (08 Jul 2024 16:12) (18 - 18)  SpO2: 97% (08 Jul 2024 16:12) (97% - 100%)    Parameters below as of 08 Jul 2024 16:12  Patient On (Oxygen Delivery Method): room air                            11.2   7.72  )-----------( 158      ( 08 Jul 2024 21:28 )             34.1   07-08    139  |  104  |  8<L>  ----------------------------<  79  3.8   |  17  |  0.9    Ca    8.9      08 Jul 2024 21:28  Phos  2.9     07-08  Mg     1.9     07-08    Diagnosis: SBO due to small bowel mass                  S/p exploratory laparotomy, small bowel resection                    Plan:	  - supportive care  - GI/DVT prophylaxis  - clear liquids diet  - pain management  - incentive spirometer    - follow up consults  - out of bed to chair and ambulate  - repeat studies as needed  - replace electrolytes  - case management evaluation     Ann-Marie Elizalde MD, FACS  Trauma/ACS/Surgical Critical Care Attending
ACS Attending  Note Attestation    Patient is examined and evaluated at the bedside with the residents/PAs. Treatment plan discussed with the team, nurses, and consulting physicians and consulting teams. Medications, radiological studies and all other relevant studies reviewed.     KG GLEZ Patient is a 68y old  Female who presents with a chief complaint of recurrent intussusception due to mass, S/P exploratory laparotomy, small bowel resection with primary anastomosis .  Abdomen soft, not distended, no rebound or guarding, mild solis-incisional tenderness, incision clean, dry, intact. Flatus +    Vital Signs Last 24 Hrs  T(C): 36.7 (09 Jul 2024 23:58), Max: 37 (09 Jul 2024 20:42)  T(F): 98 (09 Jul 2024 23:58), Max: 98.6 (09 Jul 2024 20:42)  HR: 56 (09 Jul 2024 23:58) (56 - 66)  BP: 122/86 (09 Jul 2024 23:58) (122/75 - 158/80)  BP(mean): --  RR: 18 (09 Jul 2024 23:58) (18 - 18)  SpO2: 98% (09 Jul 2024 23:58) (96% - 99%)    Parameters below as of 09 Jul 2024 20:42  Patient On (Oxygen Delivery Method): room air                        10.9   6.41  )-----------( 180      ( 09 Jul 2024 22:38 )             32.3   07-09    138  |  101  |  8<L>  ----------------------------<  85  3.6   |  21  |  0.7    Ca    8.8      09 Jul 2024 22:38  Phos  3.1     07-09  Mg     1.8     07-09      Diagnosis: SBO due to small bowel mass                  S/p exploratory laparotomy, small bowel resection                  left arm edema, No thrombophlebitis.                    Plan:	  - supportive care  - GI/DVT prophylaxis  - clear liquids diet advance to full liquids  - pain management  - incentive spirometer    - follow up official report of left arm duplex   - follow up consults  - out of bed to chair and ambulate  - repeat studies as needed  - replace electrolytes  - case management evaluation     Ann-Marie Elizalde MD, FACS  Trauma/ACS/Surgical Critical Care Attending
ACS Attending  Note Attestation    Patient is examined and evaluated at the bedside with the residents/PAs. Treatment plan discussed with the team, nurses, and consulting physicians and consulting teams. Medications, radiological studies and all other relevant studies reviewed.     KG GLEZ Patient is a 68y old  Female who presents with a chief complaint of recurrent intussusception due to mass, S/P exploratory laparotomy, small bowel resection with primary anastomosis .  Abdomen soft, not distended, no rebound or guarding, mild solis-incisional tenderness, incision clean, dry, intact. Flatus +, BM+    Vital Signs Last 24 Hrs  T(C): 36.8 (10 Jul 2024 07:59), Max: 37 (09 Jul 2024 20:42)  T(F): 98.3 (10 Jul 2024 07:59), Max: 98.6 (09 Jul 2024 20:42)  HR: 63 (10 Jul 2024 07:59) (56 - 64)  BP: 120/80 (10 Jul 2024 07:59) (116/76 - 135/83)  BP(mean): --  RR: 18 (10 Jul 2024 07:59) (18 - 18)  SpO2: 99% (10 Jul 2024 07:59) (96% - 99%)    Parameters below as of 09 Jul 2024 20:42  Patient On (Oxygen Delivery Method): room air                       10.9   6.41  )-----------( 180      ( 09 Jul 2024 22:38 )             32.3   07-09    138  |  101  |  8<L>  ----------------------------<  85  3.6   |  21  |  0.7  Ca    8.8      09 Jul 2024 22:38  Phos  3.1     07-09  Mg     1.8     07-09    Diagnosis: SBO due to small bowel mass                  S/p exploratory laparotomy, small bowel resection                  left arm edema, No thrombophlebitis.                    Plan:	  - supportive care  - GI/DVT prophylaxis  - advance to low fat diet  - pain management  - incentive spirometer    - follow up official report of left arm duplex   - follow up consults  - out of bed to chair and ambulate  - repeat studies as needed  - replace electrolytes  - case management evaluation     Ann-Marie Elizalde MD, FACS  Trauma/ACS/Surgical Critical Care Attending
ACS Attending  Note Attestation    Patient is examined and evaluated at the bedside with the residents/PAs. Treatment plan discussed with the team, nurses, and consulting physicians and consulting teams. Medications, radiological studies and all other relevant studies reviewed.     KG GLEZ Patient is a 68y old  Female who presents with a chief complaint of recurrent intussusception due to mass, S/P exploratory laparotomy, small bowel resection with primary anastomosis     Vital Signs Last 24 Hrs  T(C): 36.8 (07 Jul 2024 16:06), Max: 36.8 (07 Jul 2024 16:06)  T(F): 98.2 (07 Jul 2024 16:06), Max: 98.2 (07 Jul 2024 16:06)  HR: 78 (07 Jul 2024 16:06) (57 - 82)  BP: 138/80 (07 Jul 2024 16:06) (124/77 - 145/63)  BP(mean): --  RR: 18 (07 Jul 2024 16:06) (18 - 20)  SpO2: 98% (07 Jul 2024 16:06) (97% - 99%)    Parameters below as of 07 Jul 2024 16:06  Patient On (Oxygen Delivery Method): room air                       13.1   12.18 )-----------( 184      ( 07 Jul 2024 07:42 )             38.3     07-07  131<L>  |  98  |  8<L>  ----------------------------<  105<H>  4.0   |  20  |  0.8  Ca    8.8      07 Jul 2024 07:42  Phos  3.7     07-07  Mg     1.7     07-07    TPro  7.1  /  Alb  4.8  /  TBili  0.9  /  DBili  x   /  AST  19  /  ALT  10  /  AlkPhos  110  07-06    Diagnosis: SBO due to small bowel mass                  S/p exploratory laparotomy, small bowel resection                      Plan:	  - supportive care  - GI/DVT prophylaxis  - clear liquids diet  - pain management  - incentive spirometer    - follow up consults  - out of bed to chair and ambulate  - repeat studies as needed  - replace electrolytes  - case management evaluation     Ann-Marie Elizalde MD, FACS  Trauma/ACS/Surgical Critical Care Attending

## 2024-07-10 NOTE — DISCHARGE NOTE NURSING/CASE MANAGEMENT/SOCIAL WORK - PATIENT PORTAL LINK FT
You can access the FollowMyHealth Patient Portal offered by [facility] by registering at the following website: [email or web address]. By joining [facility]’s FollowMyHealth portal, you will also be able to view your health information using other applications (apps) compatible with our system.

## 2024-07-11 LAB — SURGICAL PATHOLOGY STUDY: SIGNIFICANT CHANGE UP

## 2024-07-17 NOTE — CDI QUERY NOTE - NSCDIOTHERTXTBX_GEN_ALL_CORE_HH
DOCUMENTATION CLARIFICATION FORM  Encounter #: [id number]                     Patient’s Name: KG GLEZ  Medical Record #: [id number]                  Admit Date: 2024  : 1955                                     Discharged: 7-  CDI Specialist: Frances                             Contact #: [phone]    Dear Dr. Fishman,                                       Date: 2024              Clinical documentation and/or evidence in the medical record indicates that this patient has a pathology report finding without an associated diagnosis. In order to ensure accurate coding and accuracy of the clinical record, the documentation in this patient’s medical record requires additional clarification.      Please include documentation of a diagnosis associated with these findings in your progress note and/or discharge summary.     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    -The non-neoplastic small intestine showing ischemic change,  intussusception clinically.  -No lymph node identified in this material  -Resection margins appearing viable, no tumor seen.

## 2024-07-24 DIAGNOSIS — K56.1 INTUSSUSCEPTION: ICD-10-CM

## 2024-07-24 DIAGNOSIS — E03.9 HYPOTHYROIDISM, UNSPECIFIED: ICD-10-CM

## 2024-07-24 DIAGNOSIS — K21.9 GASTRO-ESOPHAGEAL REFLUX DISEASE WITHOUT ESOPHAGITIS: ICD-10-CM

## 2024-07-24 DIAGNOSIS — Q85.89 OTHER PHAKOMATOSES, NOT ELSEWHERE CLASSIFIED: ICD-10-CM

## 2024-07-24 DIAGNOSIS — Z79.890 HORMONE REPLACEMENT THERAPY: ICD-10-CM

## 2024-07-24 DIAGNOSIS — K66.0 PERITONEAL ADHESIONS (POSTPROCEDURAL) (POSTINFECTION): ICD-10-CM

## 2024-07-24 DIAGNOSIS — D36.15 BENIGN NEOPLASM OF PERIPHERAL NERVES AND AUTONOMIC NERVOUS SYSTEM OF ABDOMEN: ICD-10-CM

## 2024-07-24 DIAGNOSIS — R42 DIZZINESS AND GIDDINESS: ICD-10-CM

## 2024-07-24 DIAGNOSIS — Z53.31 LAPAROSCOPIC SURGICAL PROCEDURE CONVERTED TO OPEN PROCEDURE: ICD-10-CM

## 2024-07-24 DIAGNOSIS — Z90.49 ACQUIRED ABSENCE OF OTHER SPECIFIED PARTS OF DIGESTIVE TRACT: ICD-10-CM

## 2024-07-24 NOTE — CHART NOTE - NSCHARTNOTEFT_GEN_A_CORE
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

## 2024-07-26 ENCOUNTER — TRANSCRIPTION ENCOUNTER (OUTPATIENT)
Age: 69
End: 2024-07-26

## 2024-07-26 ENCOUNTER — APPOINTMENT (OUTPATIENT)
Dept: SURGERY | Facility: CLINIC | Age: 69
End: 2024-07-26

## 2024-07-26 VITALS
HEART RATE: 75 BPM | HEIGHT: 67 IN | TEMPERATURE: 97 F | WEIGHT: 133 LBS | BODY MASS INDEX: 20.88 KG/M2 | SYSTOLIC BLOOD PRESSURE: 128 MMHG | OXYGEN SATURATION: 97 % | DIASTOLIC BLOOD PRESSURE: 78 MMHG

## 2024-07-26 DIAGNOSIS — D13.99 BENIGN NEOPLASM OF ILL-DEFINED SITES WITHIN THE DIGESTIVE SYSTEM: ICD-10-CM

## 2024-07-26 DIAGNOSIS — Z90.49 ACQUIRED ABSENCE OF OTHER SPECIFIED PARTS OF DIGESTIVE TRACT: ICD-10-CM

## 2024-07-26 DIAGNOSIS — K56.600 PARTIAL INTESTINAL OBSTRUCTION, UNSPECIFIED AS TO CAUSE: ICD-10-CM

## 2024-07-26 PROCEDURE — 99024 POSTOP FOLLOW-UP VISIT: CPT

## 2024-08-02 PROBLEM — K56.600 SMALL BOWEL OBSTRUCTION, PARTIAL: Status: ACTIVE | Noted: 2024-08-02

## 2024-08-02 PROBLEM — D13.99: Status: ACTIVE | Noted: 2024-08-02

## 2024-08-02 PROBLEM — Z90.49 S/P SMALL BOWEL RESECTION: Status: ACTIVE | Noted: 2024-08-02

## 2024-08-02 NOTE — HISTORY OF PRESENT ILLNESS
[de-identified] : This is 69 y/o female S/P exploratory laparotomy small bowel resection for small bowel tumor with introsusception. Patient presented to the office for post-OP follow up. Patient tolerated regular diet and able to return to normal activities

## 2024-08-02 NOTE — PHYSICAL EXAM
[JVD] : no jugular venous distention  [Normal Thyroid] : the thyroid was normal [Carotid Bruits] : no carotid bruits [Normal Breath Sounds] : Normal breath sounds [Respiratory Effort] : normal respiratory effort [Normal Heart Sounds] : normal heart sounds [Normal Rate and Rhythm] : normal rate and rhythm [Abdominal Masses] : No abdominal masses [Abdomen Tenderness] : ~T ~M No abdominal tenderness [Tender] : was nontender [Enlarged] : not enlarged [No Rash or Lesion] : No rash or lesion [Alert] : alert [Oriented to Person] : oriented to person [Oriented to Place] : oriented to place [Oriented to Time] : oriented to time [de-identified] : well nourished, well developed female in no acute distress [de-identified] : ELY [de-identified] : Supple [de-identified] : midline incision well healed, staples removed at the time of exam

## 2024-09-03 ENCOUNTER — APPOINTMENT (OUTPATIENT)
Dept: OBGYN | Facility: CLINIC | Age: 69
End: 2024-09-03
Payer: MEDICARE

## 2024-09-03 VITALS
HEART RATE: 71 BPM | BODY MASS INDEX: 21.03 KG/M2 | TEMPERATURE: 98.1 F | WEIGHT: 134 LBS | SYSTOLIC BLOOD PRESSURE: 128 MMHG | HEIGHT: 67 IN | DIASTOLIC BLOOD PRESSURE: 80 MMHG | OXYGEN SATURATION: 99 %

## 2024-09-03 DIAGNOSIS — Z12.39 ENCOUNTER FOR OTHER SCREENING FOR MALIGNANT NEOPLASM OF BREAST: ICD-10-CM

## 2024-09-03 DIAGNOSIS — Z01.419 ENCOUNTER FOR GYNECOLOGICAL EXAMINATION (GENERAL) (ROUTINE) W/OUT ABNORMAL FINDINGS: ICD-10-CM

## 2024-09-03 LAB
BILIRUB UR QL STRIP: NEGATIVE
CLARITY UR: NORMAL
COLLECTION METHOD: NORMAL
GLUCOSE UR-MCNC: NEGATIVE
HCG UR QL: 0.2 EU/DL
HGB UR QL STRIP.AUTO: NEGATIVE
KETONES UR-MCNC: NEGATIVE
LEUKOCYTE ESTERASE UR QL STRIP: NEGATIVE
NITRITE UR QL STRIP: NEGATIVE
PH UR STRIP: 5.5
PROT UR STRIP-MCNC: NEGATIVE
SP GR UR STRIP: 1.01

## 2024-09-03 PROCEDURE — G0101: CPT

## 2024-09-03 PROCEDURE — 81003 URINALYSIS AUTO W/O SCOPE: CPT | Mod: QW

## 2024-09-03 NOTE — PLAN
[FreeTextEntry1] : patient has an occasional sharp pain intermittent left chest wall area and breast. she is taking daily walks and stretches upper chest so I discussed with her that this could be causing some chest wall pain or discomfort

## 2024-09-03 NOTE — HISTORY OF PRESENT ILLNESS
[LMP unknown] : LMP unknown [Yes] : pregnancy [unknown] : Patient is unsure of the date of her LMP [FreeTextEntry1] : PATIENT PRESENT FOR ANNUAL GYN EXAM [PapSmeardate] : 3/4/22 [ColonoscopyDate] : 2023 [PGxTotal] : 1 [facility]

## 2024-12-24 NOTE — ED ADULT NURSE NOTE - CHIEF COMPLAINT QUOTE
----- Message from Татьяна sent at 12/23/2024  2:25 PM CST -----  Regarding: called to schedule medical consult. lvm/pm 1st attempt  called to schedule medical consult. lvm/pm 1st attempt  
Pt c/o dehydration, unable to tolerate PO intake x3 weeks ago after getting Endoscopy. Pt also recently dx'd w/ Intussusception. Pt recently seen in ED for similar symptoms.

## 2025-07-02 ENCOUNTER — APPOINTMENT (OUTPATIENT)
Dept: PULMONOLOGY | Facility: CLINIC | Age: 70
End: 2025-07-02
Payer: MEDICARE

## 2025-07-02 VITALS
DIASTOLIC BLOOD PRESSURE: 88 MMHG | HEART RATE: 51 BPM | WEIGHT: 148 LBS | RESPIRATION RATE: 15 BRPM | OXYGEN SATURATION: 99 % | SYSTOLIC BLOOD PRESSURE: 130 MMHG | HEIGHT: 68 IN | BODY MASS INDEX: 22.43 KG/M2

## 2025-07-02 PROCEDURE — G2211 COMPLEX E/M VISIT ADD ON: CPT

## 2025-07-02 PROCEDURE — 99212 OFFICE O/P EST SF 10 MIN: CPT
